# Patient Record
Sex: FEMALE | Race: WHITE | NOT HISPANIC OR LATINO | Employment: OTHER | URBAN - METROPOLITAN AREA
[De-identification: names, ages, dates, MRNs, and addresses within clinical notes are randomized per-mention and may not be internally consistent; named-entity substitution may affect disease eponyms.]

---

## 2020-12-02 ENCOUNTER — HISTORICAL (OUTPATIENT)
Dept: INTERNAL MEDICINE | Facility: CLINIC | Age: 55
End: 2020-12-02

## 2020-12-02 LAB
ABS NEUT (OLG): 3.84 X10(3)/MCL (ref 2.1–9.2)
ALBUMIN SERPL-MCNC: 4 GM/DL (ref 3.5–5)
ALBUMIN/GLOB SERPL: 1.1 RATIO (ref 1.1–2)
ALP SERPL-CCNC: 121 UNIT/L (ref 40–150)
ALT SERPL-CCNC: 36 UNIT/L (ref 0–55)
APPEARANCE, UA: CLEAR
AST SERPL-CCNC: 23 UNIT/L (ref 5–34)
BACTERIA #/AREA URNS AUTO: ABNORMAL /HPF
BASOPHILS # BLD AUTO: 0 X10(3)/MCL (ref 0–0.2)
BASOPHILS NFR BLD AUTO: 1 %
BILIRUB SERPL-MCNC: 0.6 MG/DL
BILIRUB UR QL STRIP: NEGATIVE
BILIRUBIN DIRECT+TOT PNL SERPL-MCNC: 0.2 MG/DL (ref 0–0.5)
BILIRUBIN DIRECT+TOT PNL SERPL-MCNC: 0.4 MG/DL (ref 0–0.8)
BUN SERPL-MCNC: 10 MG/DL (ref 9.8–20.1)
CALCIUM SERPL-MCNC: 9.1 MG/DL (ref 8.4–10.2)
CHLORIDE SERPL-SCNC: 102 MMOL/L (ref 98–107)
CHOLEST SERPL-MCNC: 204 MG/DL
CHOLEST/HDLC SERPL: 5 {RATIO} (ref 0–5)
CO2 SERPL-SCNC: 27 MMOL/L (ref 22–29)
COLOR UR: COLORLESS
CREAT SERPL-MCNC: 0.83 MG/DL (ref 0.55–1.02)
DEPRECATED CALCIDIOL+CALCIFEROL SERPL-MC: 38.1 NG/ML (ref 30–80)
EOSINOPHIL # BLD AUTO: 0.2 X10(3)/MCL (ref 0–0.9)
EOSINOPHIL NFR BLD AUTO: 2 %
ERYTHROCYTE [DISTWIDTH] IN BLOOD BY AUTOMATED COUNT: 12.6 % (ref 11.5–14.5)
EST. AVERAGE GLUCOSE BLD GHB EST-MCNC: 211.6 MG/DL
GLOBULIN SER-MCNC: 3.6 GM/DL (ref 2.4–3.5)
GLUCOSE (UA): NEGATIVE
GLUCOSE SERPL-MCNC: 186 MG/DL (ref 74–100)
HBA1C MFR BLD: 9 %
HCT VFR BLD AUTO: 41.1 % (ref 35–46)
HDLC SERPL-MCNC: 41 MG/DL (ref 35–60)
HGB BLD-MCNC: 13.1 GM/DL (ref 12–16)
HGB UR QL STRIP: NEGATIVE
HYALINE CASTS #/AREA URNS LPF: ABNORMAL /LPF
IMM GRANULOCYTES # BLD AUTO: 0.02 10*3/UL
IMM GRANULOCYTES NFR BLD AUTO: 0 %
KETONES UR QL STRIP: NEGATIVE
LDLC SERPL CALC-MCNC: 137 MG/DL (ref 50–140)
LEUKOCYTE ESTERASE UR QL STRIP: 500 LEU/UL
LYMPHOCYTES # BLD AUTO: 3.3 X10(3)/MCL (ref 0.6–4.6)
LYMPHOCYTES NFR BLD AUTO: 42 %
MCH RBC QN AUTO: 28.2 PG (ref 26–34)
MCHC RBC AUTO-ENTMCNC: 31.9 GM/DL (ref 31–37)
MCV RBC AUTO: 88.4 FL (ref 80–100)
MONOCYTES # BLD AUTO: 0.6 X10(3)/MCL (ref 0.1–1.3)
MONOCYTES NFR BLD AUTO: 7 %
NEUTROPHILS # BLD AUTO: 3.84 X10(3)/MCL (ref 2.1–9.2)
NEUTROPHILS NFR BLD AUTO: 48 %
NITRITE UR QL STRIP: NEGATIVE
PH UR STRIP: 5.5 [PH] (ref 4.5–8)
PLATELET # BLD AUTO: 253 X10(3)/MCL (ref 130–400)
PMV BLD AUTO: 11.8 FL (ref 7.4–10.4)
POTASSIUM SERPL-SCNC: 4.3 MMOL/L (ref 3.5–5.1)
PROT SERPL-MCNC: 7.6 GM/DL (ref 6.4–8.3)
PROT UR QL STRIP: NEGATIVE
RBC # BLD AUTO: 4.65 X10(6)/MCL (ref 4–5.2)
RBC #/AREA URNS AUTO: ABNORMAL /HPF
SODIUM SERPL-SCNC: 136 MMOL/L (ref 136–145)
SP GR UR STRIP: 1 (ref 1–1.03)
SQUAMOUS #/AREA URNS LPF: ABNORMAL /LPF
TRIGL SERPL-MCNC: 132 MG/DL (ref 37–140)
TSH SERPL-ACNC: 3.23 UIU/ML (ref 0.35–4.94)
UROBILINOGEN UR STRIP-ACNC: NORMAL
VIT B12 SERPL-MCNC: 1314 PG/ML (ref 213–816)
VLDLC SERPL CALC-MCNC: 26 MG/DL
WBC # SPEC AUTO: 7.9 X10(3)/MCL (ref 4.5–11)
WBC #/AREA URNS AUTO: ABNORMAL /HPF

## 2021-02-09 ENCOUNTER — HISTORICAL (OUTPATIENT)
Dept: INTERNAL MEDICINE | Facility: CLINIC | Age: 56
End: 2021-02-09

## 2021-02-09 LAB
ALBUMIN SERPL-MCNC: 4.2 GM/DL (ref 3.5–5)
ALBUMIN/GLOB SERPL: 1.2 RATIO (ref 1.1–2)
ALP SERPL-CCNC: 101 UNIT/L (ref 40–150)
ALT SERPL-CCNC: 17 UNIT/L (ref 0–55)
AST SERPL-CCNC: 14 UNIT/L (ref 5–34)
BILIRUB SERPL-MCNC: 0.4 MG/DL
BILIRUBIN DIRECT+TOT PNL SERPL-MCNC: 0.1 MG/DL (ref 0–0.5)
BILIRUBIN DIRECT+TOT PNL SERPL-MCNC: 0.3 MG/DL (ref 0–0.8)
BUN SERPL-MCNC: 14 MG/DL (ref 9.8–20.1)
CALCIUM SERPL-MCNC: 9.4 MG/DL (ref 8.4–10.2)
CHLORIDE SERPL-SCNC: 104 MMOL/L (ref 98–107)
CO2 SERPL-SCNC: 27 MMOL/L (ref 22–29)
CREAT SERPL-MCNC: 0.78 MG/DL (ref 0.55–1.02)
CREAT UR-MCNC: 102.5 MG/DL (ref 45–106)
EST. AVERAGE GLUCOSE BLD GHB EST-MCNC: 162.8 MG/DL
GLOBULIN SER-MCNC: 3.5 GM/DL (ref 2.4–3.5)
GLUCOSE SERPL-MCNC: 140 MG/DL (ref 74–100)
HBA1C MFR BLD: 7.3 %
MICROALBUMIN UR-MCNC: 9.4 UG/ML
MICROALBUMIN/CREAT RATIO PNL UR: 9.2 MG/GM CR (ref 0–30)
POTASSIUM SERPL-SCNC: 4.7 MMOL/L (ref 3.5–5.1)
PROT SERPL-MCNC: 7.7 GM/DL (ref 6.4–8.3)
SODIUM SERPL-SCNC: 140 MMOL/L (ref 136–145)
VIT B12 SERPL-MCNC: 690 PG/ML (ref 213–816)

## 2021-03-29 ENCOUNTER — HISTORICAL (OUTPATIENT)
Dept: INTERNAL MEDICINE | Facility: CLINIC | Age: 56
End: 2021-03-29

## 2021-03-29 LAB
ABS NEUT (OLG): 4.17 X10(3)/MCL (ref 2.1–9.2)
ALBUMIN SERPL-MCNC: 4.4 GM/DL (ref 3.5–5)
ALBUMIN/GLOB SERPL: 1.1 RATIO (ref 1.1–2)
ALP SERPL-CCNC: 113 UNIT/L (ref 40–150)
ALT SERPL-CCNC: 17 UNIT/L (ref 0–55)
AST SERPL-CCNC: 16 UNIT/L (ref 5–34)
BASOPHILS # BLD AUTO: 0 X10(3)/MCL (ref 0–0.2)
BASOPHILS NFR BLD AUTO: 0 %
BILIRUB SERPL-MCNC: 0.4 MG/DL
BILIRUBIN DIRECT+TOT PNL SERPL-MCNC: 0.2 MG/DL (ref 0–0.5)
BILIRUBIN DIRECT+TOT PNL SERPL-MCNC: 0.2 MG/DL (ref 0–0.8)
BUN SERPL-MCNC: 13.6 MG/DL (ref 9.8–20.1)
CALCIUM SERPL-MCNC: 9.5 MG/DL (ref 8.4–10.2)
CHLORIDE SERPL-SCNC: 104 MMOL/L (ref 98–107)
CHOLEST SERPL-MCNC: 195 MG/DL
CHOLEST/HDLC SERPL: 4 {RATIO} (ref 0–5)
CO2 SERPL-SCNC: 28 MMOL/L (ref 22–29)
CREAT SERPL-MCNC: 0.79 MG/DL (ref 0.55–1.02)
EOSINOPHIL # BLD AUTO: 0.1 X10(3)/MCL (ref 0–0.9)
EOSINOPHIL NFR BLD AUTO: 2 %
ERYTHROCYTE [DISTWIDTH] IN BLOOD BY AUTOMATED COUNT: 12.6 % (ref 11.5–14.5)
EST. AVERAGE GLUCOSE BLD GHB EST-MCNC: 142.7 MG/DL
GLOBULIN SER-MCNC: 3.9 GM/DL (ref 2.4–3.5)
GLUCOSE SERPL-MCNC: 132 MG/DL (ref 74–100)
HBA1C MFR BLD: 6.6 %
HCT VFR BLD AUTO: 42.8 % (ref 35–46)
HDLC SERPL-MCNC: 51 MG/DL (ref 35–60)
HGB BLD-MCNC: 13.5 GM/DL (ref 12–16)
IMM GRANULOCYTES # BLD AUTO: 0.02 10*3/UL
IMM GRANULOCYTES NFR BLD AUTO: 0 %
LDLC SERPL CALC-MCNC: 125 MG/DL (ref 50–140)
LYMPHOCYTES # BLD AUTO: 2.9 X10(3)/MCL (ref 0.6–4.6)
LYMPHOCYTES NFR BLD AUTO: 38 %
MCH RBC QN AUTO: 28 PG (ref 26–34)
MCHC RBC AUTO-ENTMCNC: 31.5 GM/DL (ref 31–37)
MCV RBC AUTO: 88.8 FL (ref 80–100)
MONOCYTES # BLD AUTO: 0.5 X10(3)/MCL (ref 0.1–1.3)
MONOCYTES NFR BLD AUTO: 6 %
NEUTROPHILS # BLD AUTO: 4.17 X10(3)/MCL (ref 2.1–9.2)
NEUTROPHILS NFR BLD AUTO: 54 %
PLATELET # BLD AUTO: 278 X10(3)/MCL (ref 130–400)
PMV BLD AUTO: 11.4 FL (ref 7.4–10.4)
POTASSIUM SERPL-SCNC: 4.1 MMOL/L (ref 3.5–5.1)
PROT SERPL-MCNC: 8.3 GM/DL (ref 6.4–8.3)
RBC # BLD AUTO: 4.82 X10(6)/MCL (ref 4–5.2)
SODIUM SERPL-SCNC: 140 MMOL/L (ref 136–145)
TRIGL SERPL-MCNC: 94 MG/DL (ref 37–140)
VIT B12 SERPL-MCNC: 736 PG/ML (ref 213–816)
VLDLC SERPL CALC-MCNC: 19 MG/DL
WBC # SPEC AUTO: 7.7 X10(3)/MCL (ref 4.5–11)

## 2021-04-05 ENCOUNTER — HISTORICAL (OUTPATIENT)
Dept: ADMINISTRATIVE | Facility: HOSPITAL | Age: 56
End: 2021-04-05

## 2021-04-05 LAB
APPEARANCE, UA: CLEAR
BACTERIA #/AREA URNS AUTO: ABNORMAL /HPF
BILIRUB UR QL STRIP: NEGATIVE
COLOR UR: COLORLESS
GLUCOSE (UA): NEGATIVE
HGB UR QL STRIP: NEGATIVE
HYALINE CASTS #/AREA URNS LPF: ABNORMAL /LPF
KETONES UR QL STRIP: NEGATIVE
LEUKOCYTE ESTERASE UR QL STRIP: 25 LEU/UL
NITRITE UR QL STRIP: NEGATIVE
PH UR STRIP: 5 [PH] (ref 4.5–8)
PROT UR QL STRIP: NEGATIVE
RBC #/AREA URNS AUTO: ABNORMAL /HPF
SP GR UR STRIP: 1 (ref 1–1.03)
SQUAMOUS #/AREA URNS LPF: ABNORMAL /LPF
UROBILINOGEN UR STRIP-ACNC: NORMAL
WBC #/AREA URNS AUTO: ABNORMAL /HPF

## 2021-08-25 ENCOUNTER — HISTORICAL (OUTPATIENT)
Dept: INTERNAL MEDICINE | Facility: CLINIC | Age: 56
End: 2021-08-25

## 2021-08-25 LAB
ALBUMIN SERPL-MCNC: 4 GM/DL (ref 3.5–5)
ALBUMIN/GLOB SERPL: 1.1 RATIO (ref 1.1–2)
ALP SERPL-CCNC: 102 UNIT/L (ref 40–150)
ALT SERPL-CCNC: 17 UNIT/L (ref 0–55)
AST SERPL-CCNC: 15 UNIT/L (ref 5–34)
BILIRUB SERPL-MCNC: 0.4 MG/DL
BILIRUBIN DIRECT+TOT PNL SERPL-MCNC: 0.1 MG/DL (ref 0–0.5)
BILIRUBIN DIRECT+TOT PNL SERPL-MCNC: 0.3 MG/DL (ref 0–0.8)
BUN SERPL-MCNC: 9.3 MG/DL (ref 9.8–20.1)
CALCIUM SERPL-MCNC: 9.7 MG/DL (ref 8.4–10.2)
CHLORIDE SERPL-SCNC: 106 MMOL/L (ref 98–107)
CO2 SERPL-SCNC: 26 MMOL/L (ref 22–29)
CREAT SERPL-MCNC: 0.85 MG/DL (ref 0.55–1.02)
EST. AVERAGE GLUCOSE BLD GHB EST-MCNC: 131.2 MG/DL
GLOBULIN SER-MCNC: 3.7 GM/DL (ref 2.4–3.5)
GLUCOSE SERPL-MCNC: 131 MG/DL (ref 74–100)
HBA1C MFR BLD: 6.2 %
POTASSIUM SERPL-SCNC: 4.9 MMOL/L (ref 3.5–5.1)
PROT SERPL-MCNC: 7.7 GM/DL (ref 6.4–8.3)
SODIUM SERPL-SCNC: 142 MMOL/L (ref 136–145)

## 2022-04-10 ENCOUNTER — HISTORICAL (OUTPATIENT)
Dept: ADMINISTRATIVE | Facility: HOSPITAL | Age: 57
End: 2022-04-10

## 2022-04-30 VITALS
BODY MASS INDEX: 26.3 KG/M2 | WEIGHT: 157.88 LBS | HEIGHT: 65 IN | DIASTOLIC BLOOD PRESSURE: 74 MMHG | SYSTOLIC BLOOD PRESSURE: 110 MMHG | OXYGEN SATURATION: 98 %

## 2022-06-03 DIAGNOSIS — Z11.59 SCREENING FOR VIRAL DISEASE: ICD-10-CM

## 2022-06-03 DIAGNOSIS — E11.69 TYPE 2 DIABETES MELLITUS WITH OTHER SPECIFIED COMPLICATION, WITHOUT LONG-TERM CURRENT USE OF INSULIN: Primary | ICD-10-CM

## 2022-06-03 DIAGNOSIS — Z13.0 SCREENING FOR IRON DEFICIENCY ANEMIA: ICD-10-CM

## 2022-06-03 DIAGNOSIS — Z13.220 SCREENING FOR LIPID DISORDERS: ICD-10-CM

## 2022-06-03 DIAGNOSIS — Z11.3 ROUTINE SCREENING FOR STI (SEXUALLY TRANSMITTED INFECTION): ICD-10-CM

## 2022-06-03 DIAGNOSIS — Z13.29 SCREENING FOR THYROID DISORDER: ICD-10-CM

## 2022-06-03 DIAGNOSIS — Z11.4 SCREENING FOR HIV (HUMAN IMMUNODEFICIENCY VIRUS): ICD-10-CM

## 2022-06-03 DIAGNOSIS — Z13.21 ENCOUNTER FOR VITAMIN DEFICIENCY SCREENING: ICD-10-CM

## 2022-06-23 ENCOUNTER — LAB VISIT (OUTPATIENT)
Dept: LAB | Facility: HOSPITAL | Age: 57
End: 2022-06-23
Payer: MEDICAID

## 2022-06-23 DIAGNOSIS — E11.69 TYPE 2 DIABETES MELLITUS WITH OTHER SPECIFIED COMPLICATION, WITHOUT LONG-TERM CURRENT USE OF INSULIN: ICD-10-CM

## 2022-06-23 DIAGNOSIS — Z13.0 SCREENING FOR IRON DEFICIENCY ANEMIA: ICD-10-CM

## 2022-06-23 DIAGNOSIS — Z13.21 ENCOUNTER FOR VITAMIN DEFICIENCY SCREENING: ICD-10-CM

## 2022-06-23 DIAGNOSIS — Z11.4 SCREENING FOR HIV (HUMAN IMMUNODEFICIENCY VIRUS): ICD-10-CM

## 2022-06-23 DIAGNOSIS — Z13.29 SCREENING FOR THYROID DISORDER: ICD-10-CM

## 2022-06-23 DIAGNOSIS — Z11.59 SCREENING FOR VIRAL DISEASE: ICD-10-CM

## 2022-06-23 DIAGNOSIS — Z13.220 SCREENING FOR LIPID DISORDERS: ICD-10-CM

## 2022-06-23 DIAGNOSIS — Z11.3 ROUTINE SCREENING FOR STI (SEXUALLY TRANSMITTED INFECTION): ICD-10-CM

## 2022-06-23 LAB
ALBUMIN SERPL-MCNC: 4.1 GM/DL (ref 3.5–5)
ALBUMIN/GLOB SERPL: 1.2 RATIO (ref 1.1–2)
ALP SERPL-CCNC: 98 UNIT/L (ref 40–150)
ALT SERPL-CCNC: 19 UNIT/L (ref 0–55)
APPEARANCE UR: CLEAR
AST SERPL-CCNC: 16 UNIT/L (ref 5–34)
BACTERIA #/AREA URNS AUTO: ABNORMAL /HPF
BASOPHILS # BLD AUTO: 0.05 X10(3)/MCL (ref 0–0.2)
BASOPHILS NFR BLD AUTO: 0.7 %
BILIRUB UR QL STRIP.AUTO: NEGATIVE MG/DL
BILIRUBIN DIRECT+TOT PNL SERPL-MCNC: 0.5 MG/DL
BUN SERPL-MCNC: 9.8 MG/DL (ref 9.8–20.1)
C TRACH DNA SPEC QL NAA+PROBE: NOT DETECTED
CALCIUM SERPL-MCNC: 10 MG/DL (ref 8.4–10.2)
CHLORIDE SERPL-SCNC: 108 MMOL/L (ref 98–107)
CHOLEST SERPL-MCNC: 189 MG/DL
CHOLEST/HDLC SERPL: 4 {RATIO} (ref 0–5)
CO2 SERPL-SCNC: 27 MMOL/L (ref 22–29)
COLOR UR AUTO: YELLOW
CREAT SERPL-MCNC: 0.81 MG/DL (ref 0.55–1.02)
CREAT UR-MCNC: 172.6 MG/DL (ref 47–110)
DEPRECATED CALCIDIOL+CALCIFEROL SERPL-MC: 49.9 NG/ML (ref 30–80)
EOSINOPHIL # BLD AUTO: 0.21 X10(3)/MCL (ref 0–0.9)
EOSINOPHIL NFR BLD AUTO: 3 %
ERYTHROCYTE [DISTWIDTH] IN BLOOD BY AUTOMATED COUNT: 12.4 % (ref 11.5–17)
EST. AVERAGE GLUCOSE BLD GHB EST-MCNC: 134.1 MG/DL
FERRITIN SERPL-MCNC: 89.99 NG/ML (ref 4.63–204)
GLOBULIN SER-MCNC: 3.5 GM/DL (ref 2.4–3.5)
GLUCOSE SERPL-MCNC: 143 MG/DL (ref 74–100)
GLUCOSE UR QL STRIP.AUTO: NORMAL MG/DL
HAV IGM SERPL QL IA: NONREACTIVE
HBA1C MFR BLD: 6.3 %
HBV CORE IGM SERPL QL IA: NONREACTIVE
HBV SURFACE AG SERPL QL IA: NONREACTIVE
HCT VFR BLD AUTO: 42.4 % (ref 37–47)
HCV AB SERPL QL IA: NONREACTIVE
HDLC SERPL-MCNC: 52 MG/DL (ref 35–60)
HGB BLD-MCNC: 13.4 GM/DL (ref 12–16)
HIV 1+2 AB+HIV1 P24 AG SERPL QL IA: NONREACTIVE
HYALINE CASTS #/AREA URNS LPF: ABNORMAL /LPF
IMM GRANULOCYTES # BLD AUTO: 0.01 X10(3)/MCL (ref 0–0.02)
IMM GRANULOCYTES NFR BLD AUTO: 0.1 % (ref 0–0.43)
IRON SATN MFR SERPL: 26 % (ref 20–50)
IRON SERPL-MCNC: 80 UG/DL (ref 50–170)
KETONES UR QL STRIP.AUTO: NEGATIVE MG/DL
LDLC SERPL CALC-MCNC: 113 MG/DL (ref 50–140)
LEUKOCYTE ESTERASE UR QL STRIP.AUTO: NEGATIVE UNIT/L
LYMPHOCYTES # BLD AUTO: 3.14 X10(3)/MCL (ref 0.6–4.6)
LYMPHOCYTES NFR BLD AUTO: 44.6 %
MCH RBC QN AUTO: 28.2 PG (ref 27–31)
MCHC RBC AUTO-ENTMCNC: 31.6 MG/DL (ref 33–36)
MCV RBC AUTO: 89.1 FL (ref 80–94)
MICROALBUMIN UR-MCNC: 14.9 UG/ML
MICROALBUMIN/CREAT RATIO PNL UR: 8.6 MG/GM CR (ref 0–30)
MONOCYTES # BLD AUTO: 0.45 X10(3)/MCL (ref 0.1–1.3)
MONOCYTES NFR BLD AUTO: 6.4 %
MUCOUS THREADS URNS QL MICRO: ABNORMAL /LPF
N GONORRHOEA DNA SPEC QL NAA+PROBE: NOT DETECTED
NEUTROPHILS # BLD AUTO: 3.2 X10(3)/MCL (ref 2.1–9.2)
NEUTROPHILS NFR BLD AUTO: 45.2 %
NITRITE UR QL STRIP.AUTO: NEGATIVE
NRBC BLD AUTO-RTO: 0 %
PH UR STRIP.AUTO: 5 [PH]
PLATELET # BLD AUTO: 253 X10(3)/MCL (ref 130–400)
PMV BLD AUTO: 11.6 FL (ref 9.4–12.4)
POTASSIUM SERPL-SCNC: 4.4 MMOL/L (ref 3.5–5.1)
PROT SERPL-MCNC: 7.6 GM/DL (ref 6.4–8.3)
PROT UR QL STRIP.AUTO: NEGATIVE MG/DL
RBC # BLD AUTO: 4.76 X10(6)/MCL (ref 4.2–5.4)
RBC #/AREA URNS AUTO: ABNORMAL /HPF
RBC UR QL AUTO: ABNORMAL UNIT/L
SODIUM SERPL-SCNC: 143 MMOL/L (ref 136–145)
SP GR UR STRIP.AUTO: 1.02
SQUAMOUS #/AREA URNS LPF: ABNORMAL /HPF
T4 FREE SERPL-MCNC: 0.91 NG/DL (ref 0.7–1.48)
TIBC SERPL-MCNC: 232 UG/DL (ref 70–310)
TIBC SERPL-MCNC: 312 UG/DL (ref 250–450)
TRANSFERRIN SERPL-MCNC: 276 MG/DL (ref 180–382)
TRIGL SERPL-MCNC: 120 MG/DL (ref 37–140)
TSH SERPL-ACNC: 3.72 UIU/ML (ref 0.35–4.94)
UROBILINOGEN UR STRIP-ACNC: NORMAL MG/DL
VLDLC SERPL CALC-MCNC: 24 MG/DL
WBC # SPEC AUTO: 7 X10(3)/MCL (ref 4.5–11.5)
WBC #/AREA URNS AUTO: ABNORMAL /HPF

## 2022-06-23 PROCEDURE — 83540 ASSAY OF IRON: CPT

## 2022-06-23 PROCEDURE — 81001 URINALYSIS AUTO W/SCOPE: CPT

## 2022-06-23 PROCEDURE — 80061 LIPID PANEL: CPT

## 2022-06-23 PROCEDURE — 85025 COMPLETE CBC W/AUTO DIFF WBC: CPT

## 2022-06-23 PROCEDURE — 82043 UR ALBUMIN QUANTITATIVE: CPT

## 2022-06-23 PROCEDURE — 84443 ASSAY THYROID STIM HORMONE: CPT

## 2022-06-23 PROCEDURE — 84439 ASSAY OF FREE THYROXINE: CPT

## 2022-06-23 PROCEDURE — 82728 ASSAY OF FERRITIN: CPT

## 2022-06-23 PROCEDURE — 83036 HEMOGLOBIN GLYCOSYLATED A1C: CPT

## 2022-06-23 PROCEDURE — 80074 ACUTE HEPATITIS PANEL: CPT

## 2022-06-23 PROCEDURE — 36415 COLL VENOUS BLD VENIPUNCTURE: CPT

## 2022-06-23 PROCEDURE — 82306 VITAMIN D 25 HYDROXY: CPT

## 2022-06-23 PROCEDURE — 87491 CHLMYD TRACH DNA AMP PROBE: CPT

## 2022-06-23 PROCEDURE — 87591 N.GONORRHOEAE DNA AMP PROB: CPT

## 2022-06-23 PROCEDURE — 80053 COMPREHEN METABOLIC PANEL: CPT

## 2022-06-23 PROCEDURE — 87389 HIV-1 AG W/HIV-1&-2 AB AG IA: CPT

## 2022-06-24 LAB — PATH REV: NORMAL

## 2022-06-24 RX ORDER — MULTIVITAMIN
1 TABLET ORAL DAILY
COMMUNITY
Start: 2021-09-02 | End: 2023-11-02

## 2022-06-24 RX ORDER — METFORMIN HYDROCHLORIDE 500 MG/1
500 TABLET, EXTENDED RELEASE ORAL 2 TIMES DAILY
COMMUNITY
Start: 2021-09-02 | End: 2022-06-27

## 2022-06-26 NOTE — PROGRESS NOTES
PATIENT NAME: Alondra Plasencia  : 1965  DATE: 22  MRN: [id number]          Reason for Visit/Chief Complaint   Establish Care and lab review       History of Present Illness (HPI)     Alondra Plasencia is a 57 y.o. female presenting in clinic today Establish Care and lab review.  Previous PCP-Rubi Singh NP. PMH of DM and GERD (controlled with diet).     Denies any complaints today.     [id number]-9.3. She decreased metformin from 500 mg BID to daily. Reports home capillary blood sugar is no higher than 130. No log presented in clinic.  Pt is currently on a plant based diet, with no dairy, and very limited meat. Has added daily exercises. Intentional weight loss of perri 23lbs noted since November. She states she walks at least 3 times per week.     Denies smoking, drinking, or illicit drug use. Declines shingles and tetanus vaccines. Denies chest pain, shortness of breath, cough, headache, dizziness, weakness, abdominal pain, nausea, vomiting, diarrhea, constipation, dysuria, SI, and HI.    Breast Cancer Screening - Of note, was ordered at last office visit, but did not complete. Will reorder MMG.  Cervical Cancer Screening - Of note, was referred at last office visit. Re-referral today.  Osteoporosis Screening - Deferred due to age. Mother had osteoporosis, started on Ca 600 mg BID and vitamin D3 2000 IU daily due to plant based diet and no dairy intake.  Colon Cancer Screening - Colonoscopy in 2016 unsure of provider/location. FIT negative 2021.  Vaccines: Patient refused all vaccines      Review of Systems     Review of Systems   Constitutional: Negative.    HENT: Negative.    Eyes: Negative.    Respiratory: Negative.    Cardiovascular: Negative.    Gastrointestinal: Negative.    Endocrine: Negative.    Genitourinary: Negative.    Musculoskeletal: Negative.    Allergic/Immunologic: Negative.    Neurological: Negative.    Hematological: Negative.    Psychiatric/Behavioral: Negative.        Medical /  Social / Family History     Past Medical History:   Diagnosis Date    Crohn's disease     Diabetes mellitus, type 2     DM (diabetes mellitus)     GERD (gastroesophageal reflux disease)     Obesity, unspecified          Past Surgical History:   Procedure Laterality Date    CHOLECYSTECTOMY      FOOT SURGERY           Social History  Alondra Plasencia's  reports that she has never smoked. She has never used smokeless tobacco. She reports previous alcohol use. She reports that she does not use drugs.    Family History  Alondra Plasencia's family history includes Depression in her brother; Diabetes type II in her father; Heart disease in her mother; Multiple sclerosis in her father.    Medications and Allergies     Medications  Medication List with Changes/Refills   Current Medications    CALCIUM-VITAMIN D 600 MG-10 MCG (400 UNIT) TAB    Take 1 tablet by mouth 2 (two) times a day.   Changed and/or Refilled Medications    Modified Medication Previous Medication    METFORMIN (GLUCOPHAGE-XR) 500 MG ER 24HR TABLET metFORMIN (GLUCOPHAGE-XR) 500 MG ER 24hr tablet       Take 1 tablet (500 mg total) by mouth once daily.    Take 500 mg by mouth 2 (two) times a day.         Allergies  Review of patient's allergies indicates:  No Known Allergies    Physical Examination     Vitals:    06/27/22 1000   BP: 122/77   Pulse: 68   Resp: 20   Temp: 98.2 °F (36.8 °C)     Physical Exam  Constitutional:       Appearance: Normal appearance. She is normal weight.   HENT:      Head: Normocephalic and atraumatic.      Right Ear: External ear normal.      Left Ear: External ear normal.      Nose: Nose normal.      Mouth/Throat:      Mouth: Mucous membranes are moist.      Pharynx: Oropharynx is clear.   Eyes:      Extraocular Movements: Extraocular movements intact.      Conjunctiva/sclera: Conjunctivae normal.      Pupils: Pupils are equal, round, and reactive to light.   Cardiovascular:      Rate and Rhythm: Normal rate and regular rhythm.       Pulses: Normal pulses.           Dorsalis pedis pulses are 2+ on the right side and 2+ on the left side.        Posterior tibial pulses are 2+ on the right side and 2+ on the left side.      Heart sounds: Normal heart sounds.   Pulmonary:      Effort: Pulmonary effort is normal.      Breath sounds: Normal breath sounds.   Abdominal:      General: Bowel sounds are normal.      Palpations: Abdomen is soft.   Musculoskeletal:         General: Normal range of motion.      Cervical back: Normal range of motion and neck supple.   Feet:      Right foot:      Protective Sensation: 6 sites tested. 6 sites sensed.      Skin integrity: Skin integrity normal.      Toenail Condition: Right toenails are normal.      Left foot:      Protective Sensation: 6 sites tested. 6 sites sensed.      Skin integrity: Skin integrity normal.      Toenail Condition: Left toenails are normal.      Comments: Toes polished, unable to see nail beds.   Skin:     General: Skin is warm and dry.      Capillary Refill: Capillary refill takes less than 2 seconds.   Neurological:      General: No focal deficit present.      Mental Status: She is alert and oriented to person, place, and time.   Psychiatric:         Mood and Affect: Mood normal.         Behavior: Behavior normal.         Thought Content: Thought content normal.         Judgment: Judgment normal.           Results     Lab Results   Component Value Date    WBC 7.0 06/23/2022    RBC 4.76 06/23/2022    HGB 13.4 06/23/2022    HCT 42.4 06/23/2022    MCV 89.1 06/23/2022    MCH 28.2 06/23/2022    MCHC 31.6 (L) 06/23/2022    RDW 12.4 06/23/2022     06/23/2022    MPV 11.6 06/23/2022     Lab Results   Component Value Date     06/23/2022    K 4.4 06/23/2022    CO2 27 06/23/2022    BUN 9.8 06/23/2022    CREATININE 0.81 06/23/2022    CALCIUM 10.0 06/23/2022    ALBUMIN 4.1 06/23/2022    BILITOT 0.5 06/23/2022    ALKPHOS 98 06/23/2022    AST 16 06/23/2022    ALT 19 06/23/2022    EGFRIFAFRICA  88 (L) 08/25/2021    EGFRNONAA >60 06/23/2022     Lab Results   Component Value Date    TSH 3.7153 06/23/2022     Lab Results   Component Value Date    CHOL 189 06/23/2022    HDL 52 06/23/2022    .00 06/23/2022    TRIG 120 06/23/2022     Lab Results   Component Value Date    COLORU COLORLESS 04/05/2021    APPEARANCEUA Clear 06/23/2022    PHUR 5.0 04/05/2021    GLUCUA Negative 04/05/2021    KETONESU Negative 04/05/2021    OCCULTUA Negative 04/05/2021    NITRITE Negative 04/05/2021    LEUKOCYTESUR Negative 06/23/2022    RBCUA 0-5 06/23/2022    WBCUA 0-5 06/23/2022    BACTERIA None Seen 06/23/2022    HYALINECASTS 0-2 (A) 06/23/2022     Lab Results   Component Value Date    CREATRANDUR 172.6 (H) 06/23/2022     Lab Results   Component Value Date    [id number] 49.9 06/23/2022     Lab Results   Component Value Date    HIV Nonreactive 06/23/2022    HEPAIGM Nonreactive 06/23/2022    HEPBCOREM Nonreactive 06/23/2022    HEPCAB Nonreactive 06/23/2022     No results found for: FITDIAG, COLOGUARD  No results found for: OCCBLDIA        Assessment and Plan (including Health Maintenance)     Health Maintenance Due   Topic Date Due    Cervical Cancer Screening  Never done    COVID-19 Vaccine (1) Never done    Mammogram  Never done    Colorectal Cancer Screening  Never done       Problem List Items Addressed This Visit        Ophtho    Screening for diabetic retinopathy    Current Assessment & Plan     Fundus ordered today.           Relevant Orders    Diabetic Eye Screening Photo       Renal/    Cervical cancer screening    Current Assessment & Plan     Referral to GYN.           Relevant Orders    Ambulatory referral/consult to Gynecology    Breast cancer screening by mammogram    Current Assessment & Plan     MMG ordered.           Relevant Orders    Mammo Digital Screening Bilat w/ Aldo       Endocrine    Type 2 diabetes mellitus without complication, without long-term current use of insulin    Current Assessment &  Plan     Lab Results   Component Value Date    HGBA1C 6.3 06/23/2022     at goal.  Patient decreased Metformin to daily. Ok to continue daily dose.  Continue medications as prescribed.   Follow ADA diet.   Avoid soda, simple sweets, and limit rice/pasta/bread/starches and consume brown options when possible.    Maintain healthy weight with BMI goal <30.    Perform aerobic exercise for 150 minutes per week (or 5 days a week for 30 minutes each day).    Examine feet daily.    Obtain annual dilated eye exam.   Eye exam: 6/27/2022  Foot exam: 6/27/2022             Relevant Medications    metFORMIN (GLUCOPHAGE-XR) 500 MG ER 24hr tablet    Other Relevant Orders    Comprehensive Metabolic Panel    Hemoglobin A1C    Microalbumin/Creatinine Ratio, Urine    Urinalysis, Reflex to Urine Culture Urine, Clean Catch    BMI 27.0-27.9,adult    Current Assessment & Plan     Body mass index is 27.14 kg/m². (At goal).                GI    Screening for malignant neoplasm of colon - Primary    Current Assessment & Plan     Cologuard ordered, will refer to GI if indicated.           Relevant Orders    Cologuard Screening (Multitarget Stool DNA)          Health Maintenance Topics with due status: Not Due       Topic Last Completion Date    Lipid Panel 06/23/2022    Influenza Vaccine Not Due       Future Appointments   Date Time Provider Department Center   11/1/2022 12:00 PM LIBIA Gregorio [facility]      Virtual visit in 4 months.  RTC prn.  Labs one week prior to visit.      Signature:        LIBIA Gregorio  [facility]  [address] [id number]    Date of encounter: 6/27/22

## 2022-06-27 ENCOUNTER — CLINICAL SUPPORT (OUTPATIENT)
Dept: INTERNAL MEDICINE | Facility: CLINIC | Age: 57
End: 2022-06-27
Payer: MEDICAID

## 2022-06-27 ENCOUNTER — OFFICE VISIT (OUTPATIENT)
Dept: INTERNAL MEDICINE | Facility: CLINIC | Age: 57
End: 2022-06-27
Payer: MEDICAID

## 2022-06-27 VITALS
HEART RATE: 68 BPM | HEIGHT: 65 IN | TEMPERATURE: 98 F | BODY MASS INDEX: 26.81 KG/M2 | WEIGHT: 160.94 LBS | DIASTOLIC BLOOD PRESSURE: 77 MMHG | OXYGEN SATURATION: 98 % | RESPIRATION RATE: 20 BRPM | SYSTOLIC BLOOD PRESSURE: 122 MMHG

## 2022-06-27 DIAGNOSIS — E11.9 TYPE 2 DIABETES MELLITUS WITHOUT COMPLICATION, WITHOUT LONG-TERM CURRENT USE OF INSULIN: ICD-10-CM

## 2022-06-27 DIAGNOSIS — Z13.5 SCREENING FOR DIABETIC RETINOPATHY: ICD-10-CM

## 2022-06-27 DIAGNOSIS — Z12.31 BREAST CANCER SCREENING BY MAMMOGRAM: ICD-10-CM

## 2022-06-27 DIAGNOSIS — Z12.11 SCREENING FOR MALIGNANT NEOPLASM OF COLON: Primary | ICD-10-CM

## 2022-06-27 DIAGNOSIS — Z12.4 CERVICAL CANCER SCREENING: ICD-10-CM

## 2022-06-27 PROCEDURE — 92228 IMG RTA DETC/MNTR DS PHY/QHP: CPT | Mod: PBBFAC

## 2022-06-27 PROCEDURE — 1160F RVW MEDS BY RX/DR IN RCRD: CPT | Mod: CPTII,,,

## 2022-06-27 PROCEDURE — 99214 PR OFFICE/OUTPT VISIT, EST, LEVL IV, 30-39 MIN: ICD-10-PCS | Mod: S$PBB,,,

## 2022-06-27 PROCEDURE — 3074F PR MOST RECENT SYSTOLIC BLOOD PRESSURE < 130 MM HG: ICD-10-PCS | Mod: CPTII,,,

## 2022-06-27 PROCEDURE — 99214 OFFICE O/P EST MOD 30 MIN: CPT | Mod: S$PBB,,,

## 2022-06-27 PROCEDURE — 2025F 7 FLD RTA PHOTO W/O RTNOPTHY: CPT | Mod: PBBFAC,CPTII | Performed by: INTERNAL MEDICINE

## 2022-06-27 PROCEDURE — 3008F PR BODY MASS INDEX (BMI) DOCUMENTED: ICD-10-PCS | Mod: CPTII,,,

## 2022-06-27 PROCEDURE — 1160F PR REVIEW ALL MEDS BY PRESCRIBER/CLIN PHARMACIST DOCUMENTED: ICD-10-PCS | Mod: CPTII,,,

## 2022-06-27 PROCEDURE — 1159F PR MEDICATION LIST DOCUMENTED IN MEDICAL RECORD: ICD-10-PCS | Mod: CPTII,,,

## 2022-06-27 PROCEDURE — 1159F MED LIST DOCD IN RCRD: CPT | Mod: CPTII,,,

## 2022-06-27 PROCEDURE — 3078F DIAST BP <80 MM HG: CPT | Mod: CPTII,,,

## 2022-06-27 PROCEDURE — 3078F PR MOST RECENT DIASTOLIC BLOOD PRESSURE < 80 MM HG: ICD-10-PCS | Mod: CPTII,,,

## 2022-06-27 PROCEDURE — 3008F BODY MASS INDEX DOCD: CPT | Mod: CPTII,,,

## 2022-06-27 PROCEDURE — 99215 OFFICE O/P EST HI 40 MIN: CPT | Mod: PBBFAC

## 2022-06-27 PROCEDURE — 3074F SYST BP LT 130 MM HG: CPT | Mod: CPTII,,,

## 2022-06-27 RX ORDER — METFORMIN HYDROCHLORIDE 500 MG/1
500 TABLET, EXTENDED RELEASE ORAL DAILY
Qty: 90 TABLET | Refills: 1 | Status: SHIPPED | OUTPATIENT
Start: 2022-06-27 | End: 2022-11-01 | Stop reason: SDUPTHER

## 2022-06-27 NOTE — ASSESSMENT & PLAN NOTE
Lab Results   Component Value Date    HGBA1C 6.3 06/23/2022     at goal.  Patient decreased Metformin to daily. Ok to continue daily dose.  Continue medications as prescribed.   Follow ADA diet.   Avoid soda, simple sweets, and limit rice/pasta/bread/starches and consume brown options when possible.    Maintain healthy weight with BMI goal <30.    Perform aerobic exercise for 150 minutes per week (or 5 days a week for 30 minutes each day).    Examine feet daily.    Obtain annual dilated eye exam.   Eye exam: 6/27/2022  Foot exam: 6/27/2022

## 2022-07-01 ENCOUNTER — TELEPHONE (OUTPATIENT)
Dept: INTERNAL MEDICINE | Facility: CLINIC | Age: 57
End: 2022-07-01
Payer: MEDICAID

## 2022-07-01 DIAGNOSIS — E11.9 TYPE 2 DIABETES MELLITUS WITHOUT COMPLICATION, WITHOUT LONG-TERM CURRENT USE OF INSULIN: Primary | ICD-10-CM

## 2022-07-19 LAB — NONINV COLON CA DNA+OCC BLD SCRN STL QL: NEGATIVE

## 2022-07-19 NOTE — PROGRESS NOTES
Alondra Plasencia is a 57 y.o. female here for a diabetic eye screening with non-dilated fundus photos per ISIDRO Berg NP.    Patient cooperative?: Yes  Small pupils?: No  Last eye exam: unknown    For exam results, see Encounter Report.

## 2022-07-20 ENCOUNTER — TELEPHONE (OUTPATIENT)
Dept: INTERNAL MEDICINE | Facility: CLINIC | Age: 57
End: 2022-07-20
Payer: MEDICAID

## 2022-07-20 NOTE — TELEPHONE ENCOUNTER
I spoke with patient and informed her that her Cologuard result is negative and will be repeated in 3 yrs. Patient verbalized understanding.

## 2022-07-20 NOTE — TELEPHONE ENCOUNTER
Please inform Alondra Plasencia that the COLOGUARD stool test is Negative. Testing will be repeated in 3 years.    Thank you,    VIOLETA Colunga

## 2022-09-12 ENCOUNTER — OFFICE VISIT (OUTPATIENT)
Dept: URGENT CARE | Facility: CLINIC | Age: 57
End: 2022-09-12
Payer: MEDICAID

## 2022-09-12 VITALS
HEART RATE: 69 BPM | HEIGHT: 65 IN | DIASTOLIC BLOOD PRESSURE: 67 MMHG | SYSTOLIC BLOOD PRESSURE: 100 MMHG | TEMPERATURE: 99 F | OXYGEN SATURATION: 99 % | WEIGHT: 158.5 LBS | BODY MASS INDEX: 26.41 KG/M2 | RESPIRATION RATE: 20 BRPM

## 2022-09-12 DIAGNOSIS — N39.0 ACUTE UTI: Primary | ICD-10-CM

## 2022-09-12 DIAGNOSIS — R39.9 UTI SYMPTOMS: ICD-10-CM

## 2022-09-12 LAB
BILIRUB UR QL STRIP: NEGATIVE
GLUCOSE UR QL STRIP: NEGATIVE
KETONES UR QL STRIP: NEGATIVE
LEUKOCYTE ESTERASE UR QL STRIP: NEGATIVE
PH, POC UA: 5.5
POC BLOOD, URINE: POSITIVE
POC NITRATES, URINE: NEGATIVE
PROT UR QL STRIP: NEGATIVE
SP GR UR STRIP: 1.02 (ref 1–1.03)
UROBILINOGEN UR STRIP-ACNC: 0.2 (ref 0.1–1.1)

## 2022-09-12 PROCEDURE — 99213 OFFICE O/P EST LOW 20 MIN: CPT | Mod: S$PBB,,, | Performed by: NURSE PRACTITIONER

## 2022-09-12 PROCEDURE — 87088 URINE BACTERIA CULTURE: CPT | Performed by: NURSE PRACTITIONER

## 2022-09-12 PROCEDURE — 99213 OFFICE O/P EST LOW 20 MIN: CPT | Mod: PBBFAC | Performed by: NURSE PRACTITIONER

## 2022-09-12 PROCEDURE — 99213 PR OFFICE/OUTPT VISIT, EST, LEVL III, 20-29 MIN: ICD-10-PCS | Mod: S$PBB,,, | Performed by: NURSE PRACTITIONER

## 2022-09-12 PROCEDURE — 81003 URINALYSIS AUTO W/O SCOPE: CPT | Mod: PBBFAC | Performed by: NURSE PRACTITIONER

## 2022-09-12 RX ORDER — NITROFURANTOIN 25; 75 MG/1; MG/1
100 CAPSULE ORAL 2 TIMES DAILY
Qty: 14 CAPSULE | Refills: 0 | Status: SHIPPED | OUTPATIENT
Start: 2022-09-12 | End: 2022-09-19

## 2022-09-12 NOTE — PROGRESS NOTES
"Subjective:       Patient ID: Alondra Plasencia is a 57 y.o. female.    Vitals:  height is 5' 5" (1.651 m) and weight is 71.9 kg (158 lb 8 oz). Her temperature is 98.7 °F (37.1 °C). Her blood pressure is 100/67 and her pulse is 69. Her respiration is 20 and oxygen saturation is 99%.     Chief Complaint: Urinary Tract Infection (Patient sts symptoms started x 2 weeks ago)    Patient is a 57-year-old female, here today for burning on urination off and on over the past 2 weeks.  Not   Taking any medication for symptoms.      Constitution: Negative.   Cardiovascular: Negative.    Respiratory: Negative.     Gastrointestinal: Negative.    Genitourinary:  Positive for dysuria.     Objective:      Physical Exam   Constitutional: She is oriented to person, place, and time. She appears well-developed.   HENT:   Head: Normocephalic.   Eyes: Conjunctivae and EOM are normal. Pupils are equal, round, and reactive to light.   Neck: Neck supple.   Cardiovascular: Normal rate, regular rhythm and normal heart sounds.   Pulmonary/Chest: Effort normal and breath sounds normal.   Abdominal: Bowel sounds are normal. Soft. There is no left CVA tenderness and no right CVA tenderness.   Musculoskeletal: Normal range of motion.         General: Normal range of motion.   Neurological: She is alert and oriented to person, place, and time.   Skin: Skin is warm and dry.   Psychiatric: Her behavior is normal.   Vitals reviewed.      Assessment:       1. Acute UTI    2. UTI symptoms              Office Visit on 09/12/2022   Component Date Value Ref Range Status    POC Blood, Urine 09/12/2022 Positive (A)  Negative Final    POC Bilirubin, Urine 09/12/2022 Negative  Negative Final    POC Urobilinogen, Urine 09/12/2022 0.2  0.1 - 1.1 Final    POC Ketones, Urine 09/12/2022 Negative  Negative Final    POC Protein, Urine 09/12/2022 Negative  Negative Final    POC Nitrates, Urine 09/12/2022 Negative  Negative Final    POC Glucose, Urine 09/12/2022 Negative  " Negative Final    pH, UA 09/12/2022 5.5   Final    POC Specific Gravity, Urine 09/12/2022 1.025  1.003 - 1.029 Final    POC Leukocytes, Urine 09/12/2022 Negative  Negative Final        No results found.   Plan:       Medication as prescribed.  Urine sent for C&S.  Maintain adequate hydration.  If any flank pain, fever or any symptoms immediately go to the ER.    Acute UTI  -     nitrofurantoin, macrocrystal-monohydrate, (MACROBID) 100 MG capsule; Take 1 capsule (100 mg total) by mouth 2 (two) times daily. for 7 days  Dispense: 14 capsule; Refill: 0    UTI symptoms  -     POCT Urinalysis, Dipstick, Automated, W/O Scope  -     Urine culture

## 2022-09-14 LAB — BACTERIA UR CULT: NORMAL

## 2022-09-22 ENCOUNTER — TELEPHONE (OUTPATIENT)
Dept: URGENT CARE | Facility: CLINIC | Age: 57
End: 2022-09-22
Payer: MEDICAID

## 2022-09-22 NOTE — TELEPHONE ENCOUNTER
----- Message from Kathleen Jernigan sent at 9/19/2022 12:24 PM CDT -----  Regarding: results  Patient has not received results on her culture, please call her back.  [phone]    Thank you!

## 2022-09-26 PROBLEM — Z13.5 SCREENING FOR DIABETIC RETINOPATHY: Status: RESOLVED | Noted: 2022-06-27 | Resolved: 2022-09-26

## 2022-10-25 ENCOUNTER — LAB VISIT (OUTPATIENT)
Dept: LAB | Facility: HOSPITAL | Age: 57
End: 2022-10-25
Payer: MEDICAID

## 2022-10-25 DIAGNOSIS — E11.9 TYPE 2 DIABETES MELLITUS WITHOUT COMPLICATION, WITHOUT LONG-TERM CURRENT USE OF INSULIN: ICD-10-CM

## 2022-10-25 LAB
ALBUMIN SERPL-MCNC: 4.1 GM/DL (ref 3.5–5)
ALBUMIN/GLOB SERPL: 1.5 RATIO (ref 1.1–2)
ALP SERPL-CCNC: 92 UNIT/L (ref 40–150)
ALT SERPL-CCNC: 19 UNIT/L (ref 0–55)
AST SERPL-CCNC: 17 UNIT/L (ref 5–34)
BILIRUBIN DIRECT+TOT PNL SERPL-MCNC: 0.6 MG/DL
BUN SERPL-MCNC: 14.1 MG/DL (ref 9.8–20.1)
CALCIUM SERPL-MCNC: 9.3 MG/DL (ref 8.4–10.2)
CHLORIDE SERPL-SCNC: 106 MMOL/L (ref 98–107)
CO2 SERPL-SCNC: 24 MMOL/L (ref 22–29)
CREAT SERPL-MCNC: 0.72 MG/DL (ref 0.55–1.02)
EST. AVERAGE GLUCOSE BLD GHB EST-MCNC: 128.4 MG/DL
GFR SERPLBLD CREATININE-BSD FMLA CKD-EPI: >60 MLS/MIN/1.73/M2
GLOBULIN SER-MCNC: 2.8 GM/DL (ref 2.4–3.5)
GLUCOSE SERPL-MCNC: 127 MG/DL (ref 74–100)
HBA1C MFR BLD: 6.1 %
POTASSIUM SERPL-SCNC: 4.7 MMOL/L (ref 3.5–5.1)
PROT SERPL-MCNC: 6.9 GM/DL (ref 6.4–8.3)
SODIUM SERPL-SCNC: 143 MMOL/L (ref 136–145)

## 2022-10-25 PROCEDURE — 83036 HEMOGLOBIN GLYCOSYLATED A1C: CPT

## 2022-10-25 PROCEDURE — 80053 COMPREHEN METABOLIC PANEL: CPT

## 2022-10-25 PROCEDURE — 36415 COLL VENOUS BLD VENIPUNCTURE: CPT

## 2022-11-01 ENCOUNTER — OFFICE VISIT (OUTPATIENT)
Dept: INTERNAL MEDICINE | Facility: CLINIC | Age: 57
End: 2022-11-01
Payer: MEDICAID

## 2022-11-01 DIAGNOSIS — E11.9 TYPE 2 DIABETES MELLITUS WITHOUT COMPLICATION, WITHOUT LONG-TERM CURRENT USE OF INSULIN: Primary | ICD-10-CM

## 2022-11-01 DIAGNOSIS — Z78.0 POST-MENOPAUSAL: ICD-10-CM

## 2022-11-01 DIAGNOSIS — Z13.21 ENCOUNTER FOR VITAMIN DEFICIENCY SCREENING: ICD-10-CM

## 2022-11-01 PROBLEM — Z12.11 SCREENING FOR MALIGNANT NEOPLASM OF COLON: Status: RESOLVED | Noted: 2022-06-27 | Resolved: 2022-11-01

## 2022-11-01 PROCEDURE — 1160F RVW MEDS BY RX/DR IN RCRD: CPT | Mod: CPTII,95,,

## 2022-11-01 PROCEDURE — 1159F MED LIST DOCD IN RCRD: CPT | Mod: CPTII,95,,

## 2022-11-01 PROCEDURE — 99214 OFFICE O/P EST MOD 30 MIN: CPT | Mod: 95,,,

## 2022-11-01 PROCEDURE — 1160F PR REVIEW ALL MEDS BY PRESCRIBER/CLIN PHARMACIST DOCUMENTED: ICD-10-PCS | Mod: CPTII,95,,

## 2022-11-01 PROCEDURE — 1159F PR MEDICATION LIST DOCUMENTED IN MEDICAL RECORD: ICD-10-PCS | Mod: CPTII,95,,

## 2022-11-01 PROCEDURE — 99214 PR OFFICE/OUTPT VISIT, EST, LEVL IV, 30-39 MIN: ICD-10-PCS | Mod: 95,,,

## 2022-11-01 RX ORDER — WITCH HAZEL 50 %
2000 PADS, MEDICATED (EA) TOPICAL DAILY
COMMUNITY
End: 2023-05-02 | Stop reason: ALTCHOICE

## 2022-11-01 RX ORDER — METFORMIN HYDROCHLORIDE 500 MG/1
500 TABLET, EXTENDED RELEASE ORAL DAILY
Qty: 90 TABLET | Refills: 1 | Status: SHIPPED | OUTPATIENT
Start: 2022-11-01 | End: 2023-05-02 | Stop reason: SDUPTHER

## 2022-11-01 NOTE — ASSESSMENT & PLAN NOTE
Lab Results   Component Value Date    HGBA1C 6.1 10/25/2022    HGBA1C 6.3 06/23/2022    HGBA1C 6.2 08/25/2021    HGBA1C 6.6 03/29/2021   at goal.     Continue metformin as prescribed.   Follow ADA diet.   Avoid soda, simple sweets, and limit rice/pasta/bread/starches and consume brown options when possible.    Maintain healthy weight with BMI goal <30.    Perform aerobic exercise for 150 minutes per week (or 5 days a week for 30 minutes each day).    Examine feet daily.    Obtain annual dilated eye exam.   Eye exam: 6/27/2022  Foot exam: 6/27/2022

## 2022-11-01 NOTE — PROGRESS NOTES
VISIT DATE: 22    PATIENT NAME: Alondra Plasencia  : 1965  MRN: [id number]     The patient location is: [place], [place]  The chief complaint leading to consultation is: Lab review    Visit type: audio only    Audio time with patient: 13 minutes  30 minutes of total time spent on the encounter, which includes face to face time and non-face to face time preparing to see the patient (eg, review of tests), Obtaining and/or reviewing separately obtained history, Documenting clinical information in the electronic or other health record, Independently interpreting results (not separately reported) and communicating results to the patient/family/caregiver, or Care coordination (not separately reported).     Each patient to whom he or she provides medical services by telemedicine is:  (1) informed of the relationship between the physician and patient and the respective role of any other health care provider with respect to management of the patient; and (2) notified that he or she may decline to receive medical services by telemedicine and may withdraw from such care at any time.    Reason for visit / Chief Complaint:  Follow-up and Labs Only       History of Present Illness (HPI):  Alondra Plasencia is a 57 y.o. White female presenting virtually by audio only for Follow-up and Labs Only. PMH of DM and GERD (controlled with diet).      Labs dated 10/25/2022 reviewed and discussed with patient.     [id number]-4.1. She is compliant metformin 500 mg daily.  Reports home capillary blood sugar is no higher than 130. No log presented in clinic.  Pt is currently on a plant based diet, with no dairy, and very limited meat. Has added daily exercises. Intentional weight loss of perri 23lbs noted since November. She states she walks at least 3 times per week. She states she is taking Vitamin B12 daily.    Denies smoking, drinking, or illicit drug use. Denies chest pain, shortness of breath, cough, headache, dizziness, weakness, abdominal  pain, nausea, vomiting, diarrhea, constipation, dysuria, SI, and HI.    Breast Cancer Screening - Scheduled: 11/28/2022.  Cervical Cancer Screening - Scheduled: 3/14/2023.  Osteoporosis Screening - Deferred due to age. Mother had osteoporosis, started on Ca 600 mg BID and vitamin D3 2000 IU daily due to plant based diet and no dairy intake.  Colon Cancer Screening - 7/10/2022-cologuard negative.         Review of Systems     Review of Systems   Constitutional: Negative.    HENT: Negative.     Eyes: Negative.    Respiratory: Negative.     Cardiovascular: Negative.    Gastrointestinal: Negative.    Endocrine: Negative.    Genitourinary: Negative.    Musculoskeletal: Negative.    Skin: Negative.    Allergic/Immunologic: Negative.    Neurological: Negative.    Hematological: Negative.    Psychiatric/Behavioral: Negative.     All other systems reviewed and are negative.    Medical / Social / Family History     Past Medical History:   Diagnosis Date    Diabetes mellitus, type 2     GERD (gastroesophageal reflux disease)     Obesity, unspecified          Past Surgical History:   Procedure Laterality Date    CHOLECYSTECTOMY      FOOT SURGERY           Social History  Alondra Plasencia's  reports that she has never smoked. She has never used smokeless tobacco. She reports that she does not currently use alcohol. She reports that she does not use drugs.    Family History  Alondra Plasencia's family history includes Depression in her brother; Diabetes type II in her father; Heart disease in her mother; Multiple sclerosis in her father.    Medications and Allergies     Medications  Outpatient Medications Marked as Taking for the 11/1/22 encounter (Office Visit) with LIBIA Gregorio   Medication Sig Dispense Refill    calcium-vitamin D 600 mg-10 mcg (400 unit) Tab Take 1 tablet by mouth once daily.      cyanocobalamin 2000 MCG tablet Take 2,000 mcg by mouth once daily.      [DISCONTINUED] metFORMIN (GLUCOPHAGE-XR) 500 MG ER 24hr  tablet Take 1 tablet (500 mg total) by mouth once daily. 90 tablet 1       Allergies  Review of patient's allergies indicates:  No Known Allergies    Physical Examination   No vitals due to virtual visit.  Physical Exam  Pulmonary:      Effort: Pulmonary effort is normal.   Neurological:      General: No focal deficit present.      Mental Status: She is alert and oriented to person, place, and time.   Psychiatric:         Mood and Affect: Mood normal.         Behavior: Behavior normal.         Thought Content: Thought content normal.         Judgment: Judgment normal.         Results     Lab Results   Component Value Date    WBC 7.0 06/23/2022    RBC 4.76 06/23/2022    HGB 13.4 06/23/2022    HCT 42.4 06/23/2022    MCV 89.1 06/23/2022    MCH 28.2 06/23/2022    MCHC 31.6 (L) 06/23/2022    RDW 12.4 06/23/2022     06/23/2022    MPV 11.6 06/23/2022     Lab Results   Component Value Date     10/25/2022    K 4.7 10/25/2022    CO2 24 10/25/2022    BUN 14.1 10/25/2022    CREATININE 0.72 10/25/2022    CALCIUM 9.3 10/25/2022    ALBUMIN 4.1 10/25/2022    BILITOT 0.6 10/25/2022    ALKPHOS 92 10/25/2022    AST 17 10/25/2022    ALT 19 10/25/2022    EGFRIFAFRICA 88 (L) 08/25/2021    EGFRNONAA >60 06/23/2022     Lab Results   Component Value Date    TSH 3.7153 06/23/2022     Lab Results   Component Value Date    CHOL 189 06/23/2022    HDL 52 06/23/2022    .00 06/23/2022    TRIG 120 06/23/2022     Lab Results   Component Value Date    PHUR 5.5 09/12/2022    PROTEINUA Negative 10/25/2022    GLUCUA Negative 04/05/2021    KETONESU Negative 04/05/2021    OCCULTUA Negative 04/05/2021    NITRITE Negative 04/05/2021    LEUKOCYTESUR 25 (A) 10/25/2022     Lab Results   Component Value Date    CREATRANDUR 130.3 (H) 10/25/2022     Lab Results   Component Value Date    [id number] 49.9 06/23/2022     Lab Results   Component Value Date    HIV Nonreactive 06/23/2022    HEPAIGM Nonreactive 06/23/2022    HEPBCOREM Nonreactive  06/23/2022    HEPCAB Nonreactive 06/23/2022     Lab Results   Component Value Date    COLOGUARD Negative 07/10/2022         Assessment and Plan (including Health Maintenance)     Health Maintenance Due   Topic Date Due    Cervical Cancer Screening  Never done    COVID-19 Vaccine (1) Never done    Mammogram  Never done    Influenza Vaccine (1) Never done       Problem List Items Addressed This Visit          Renal/    Post-menopausal    Current Assessment & Plan     Dexa bone density scan ordered.         Relevant Orders    DXA Bone Density Spine And Hip       Endocrine    Type 2 diabetes mellitus without complication, without long-term current use of insulin - Primary    Current Assessment & Plan     Lab Results   Component Value Date    HGBA1C 6.1 10/25/2022    HGBA1C 6.3 06/23/2022    HGBA1C 6.2 08/25/2021    HGBA1C 6.6 03/29/2021   at goal.     Continue metformin as prescribed.   Follow ADA diet.   Avoid soda, simple sweets, and limit rice/pasta/bread/starches and consume brown options when possible.    Maintain healthy weight with BMI goal <30.    Perform aerobic exercise for 150 minutes per week (or 5 days a week for 30 minutes each day).    Examine feet daily.    Obtain annual dilated eye exam.   Eye exam: 6/27/2022  Foot exam: 6/27/2022          Relevant Medications    metFORMIN (GLUCOPHAGE-XR) 500 MG ER 24hr tablet    Other Relevant Orders    CBC Auto Differential    Comprehensive Metabolic Panel    Hemoglobin A1C    Lipid Panel    Microalbumin/Creatinine Ratio, Urine    Urinalysis, Reflex to Urine Culture Urine, Clean Catch     Other Visit Diagnoses       Encounter for vitamin deficiency screening        Relevant Orders    Vitamin D    Vitamin B12    Folate            Health Maintenance Topics with due status: Not Due       Topic Last Completion Date    Lipid Panel 06/23/2022    Colorectal Cancer Screening 07/10/2022       Future Appointments   Date Time Provider Department Center   11/28/2022 10:45 AM  [facility] MAMMO2 [facility] MAMMO [place] [facility]   3/14/2023  9:10 AM Virginia Caceres, DIANE [facility] GYN [place] [facility]            Signature:  LIBIA Gregorio  [facility]  [address] [id number]    Date of encounter: 11/1/22

## 2023-03-02 ENCOUNTER — PATIENT OUTREACH (OUTPATIENT)
Dept: ADMINISTRATIVE | Facility: HOSPITAL | Age: 58
End: 2023-03-02
Payer: MEDICAID

## 2023-03-02 NOTE — PROGRESS NOTES
Population Health Outreach. Chart Review.   The following below are needed for Health Maintenance.    Breast Cancer Screening  Cervical Cancer Screening. Pt has GYN appt 3/14/2023.  Next pcp f/u 5/2/2023

## 2023-04-28 ENCOUNTER — APPOINTMENT (OUTPATIENT)
Dept: LAB | Facility: HOSPITAL | Age: 58
End: 2023-04-28
Payer: MEDICAID

## 2023-05-02 ENCOUNTER — OFFICE VISIT (OUTPATIENT)
Dept: INTERNAL MEDICINE | Facility: CLINIC | Age: 58
End: 2023-05-02
Payer: MEDICAID

## 2023-05-02 VITALS
DIASTOLIC BLOOD PRESSURE: 63 MMHG | SYSTOLIC BLOOD PRESSURE: 100 MMHG | RESPIRATION RATE: 18 BRPM | WEIGHT: 164.19 LBS | HEART RATE: 72 BPM | HEIGHT: 65 IN | BODY MASS INDEX: 27.36 KG/M2 | TEMPERATURE: 98 F

## 2023-05-02 DIAGNOSIS — Z13.21 ENCOUNTER FOR VITAMIN DEFICIENCY SCREENING: ICD-10-CM

## 2023-05-02 DIAGNOSIS — Z13.0 SCREENING FOR IRON DEFICIENCY ANEMIA: ICD-10-CM

## 2023-05-02 DIAGNOSIS — E78.2 MIXED HYPERLIPIDEMIA: ICD-10-CM

## 2023-05-02 DIAGNOSIS — Z12.4 CERVICAL CANCER SCREENING: ICD-10-CM

## 2023-05-02 DIAGNOSIS — J34.89 DRY NARES: ICD-10-CM

## 2023-05-02 DIAGNOSIS — E11.9 TYPE 2 DIABETES MELLITUS WITHOUT COMPLICATION, WITHOUT LONG-TERM CURRENT USE OF INSULIN: Primary | ICD-10-CM

## 2023-05-02 DIAGNOSIS — Z13.29 SCREENING FOR THYROID DISORDER: ICD-10-CM

## 2023-05-02 PROCEDURE — 1159F PR MEDICATION LIST DOCUMENTED IN MEDICAL RECORD: ICD-10-PCS | Mod: CPTII,,,

## 2023-05-02 PROCEDURE — 1159F MED LIST DOCD IN RCRD: CPT | Mod: CPTII,,,

## 2023-05-02 PROCEDURE — 3078F PR MOST RECENT DIASTOLIC BLOOD PRESSURE < 80 MM HG: ICD-10-PCS | Mod: CPTII,,,

## 2023-05-02 PROCEDURE — 3061F PR NEG MICROALBUMINURIA RESULT DOCUMENTED/REVIEW: ICD-10-PCS | Mod: CPTII,,,

## 2023-05-02 PROCEDURE — 1160F RVW MEDS BY RX/DR IN RCRD: CPT | Mod: CPTII,,,

## 2023-05-02 PROCEDURE — 1160F PR REVIEW ALL MEDS BY PRESCRIBER/CLIN PHARMACIST DOCUMENTED: ICD-10-PCS | Mod: CPTII,,,

## 2023-05-02 PROCEDURE — 3066F NEPHROPATHY DOC TX: CPT | Mod: CPTII,,,

## 2023-05-02 PROCEDURE — 99214 OFFICE O/P EST MOD 30 MIN: CPT | Mod: PBBFAC

## 2023-05-02 PROCEDURE — 3078F DIAST BP <80 MM HG: CPT | Mod: CPTII,,,

## 2023-05-02 PROCEDURE — 3008F BODY MASS INDEX DOCD: CPT | Mod: CPTII,,,

## 2023-05-02 PROCEDURE — 3074F SYST BP LT 130 MM HG: CPT | Mod: CPTII,,,

## 2023-05-02 PROCEDURE — 99214 PR OFFICE/OUTPT VISIT, EST, LEVL IV, 30-39 MIN: ICD-10-PCS | Mod: S$PBB,,,

## 2023-05-02 PROCEDURE — 99214 OFFICE O/P EST MOD 30 MIN: CPT | Mod: S$PBB,,,

## 2023-05-02 PROCEDURE — 3066F PR DOCUMENTATION OF TREATMENT FOR NEPHROPATHY: ICD-10-PCS | Mod: CPTII,,,

## 2023-05-02 PROCEDURE — 3074F PR MOST RECENT SYSTOLIC BLOOD PRESSURE < 130 MM HG: ICD-10-PCS | Mod: CPTII,,,

## 2023-05-02 PROCEDURE — 3061F NEG MICROALBUMINURIA REV: CPT | Mod: CPTII,,,

## 2023-05-02 PROCEDURE — 3008F PR BODY MASS INDEX (BMI) DOCUMENTED: ICD-10-PCS | Mod: CPTII,,,

## 2023-05-02 RX ORDER — METFORMIN HYDROCHLORIDE 500 MG/1
500 TABLET, EXTENDED RELEASE ORAL DAILY
Qty: 90 TABLET | Refills: 1 | Status: SHIPPED | OUTPATIENT
Start: 2023-05-02 | End: 2023-11-02 | Stop reason: SDUPTHER

## 2023-05-02 RX ORDER — MUPIROCIN 20 MG/G
OINTMENT TOPICAL 3 TIMES DAILY
Qty: 15 G | Refills: 1 | Status: SHIPPED | OUTPATIENT
Start: 2023-05-02 | End: 2023-11-02

## 2023-05-02 NOTE — ASSESSMENT & PLAN NOTE
Lab Results   Component Value Date    .00 04/28/2023       Lab Results   Component Value Date    TRIG 145 (H) 04/28/2023       Lab Results   Component Value Date    HDL 65 (H) 04/28/2023        Lab Results   Component Value Date    CHOL 234 (H) 04/28/2023      If LDL elevated at next office visit, will initiate statin therapy.  Follow a low cholesterol, low saturated fat diet with less than 200 mg of cholesterol a day.   Avoid fried foods and high saturated fats.  Add flax seed or fish oil supplements to diet.   Increase dietary fiber.   Regular exercise improves cholesterol levels.  Physical activity 5 times a week for 30 minutes per day (or 150 minutes per week).   Stressed importance of dietary modifications.

## 2023-05-02 NOTE — ASSESSMENT & PLAN NOTE
Lab Results   Component Value Date    HGBA1C 6.6 04/28/2023    HGBA1C 6.1 10/25/2022    HGBA1C 6.3 06/23/2022    HGBA1C 6.2 08/25/2021     Continue  as prescribed.   Follow ADA diet.   Check blood glucose twice daily and as needed. Bring log to every office visit.  Avoid soda, simple sweets, and limit rice/pasta/bread/starches and consume brown options when possible.    Maintain healthy weight with BMI goal <30.    Perform aerobic exercise for 150 minutes per week (or 5 days a week for 30 minutes each day).    Examine feet daily.    Obtain annual dilated eye exam.   Kidney Protection: ACEI/ARB  Statin Therapy: (LDL)  Eye exam:   Foot exam:

## 2023-05-02 NOTE — PATIENT INSTRUCTIONS
Call Centralized Scheduling at [phone] to schedule mammogram.    REMINDER: Please complete labs within 1 week of appointment.   Please complete satisfaction survey when received. Thank you.

## 2023-05-02 NOTE — PROGRESS NOTES
PATIENT NAME: Alondra Plasencia  : 1965  DATE: 23  MRN: [id number]          Reason for Visit/Chief Complaint   Follow-up and Diabetes       History of Present Illness (HPI)     Alondra Plasencia is a 58 y.o. White female presenting in clinic today for Follow-up and Diabetes. PMH of DM and GERD (controlled with diet).      All pertinent labs dated 2023 and diagnotic tests reviewed and discussed with patient.     SxzN2m-2.6 - at goal. She is compliant metformin 500 mg daily.  Reports home capillary blood sugar is no higher than 130. No log presented in clinic.  She reports that she started to eat meat again.      She reports periodically have dry nares and she had used Bactroban in the past. She is requesting refill.     Denies smoking, drinking, or illicit drug use. Denies chest pain, shortness of breath, cough, headache, dizziness, weakness, abdominal pain, nausea, vomiting, diarrhea, constipation, dysuria, SI, and HI.    Breast Cancer Screening - Scheduled: 2022.  Cervical Cancer Screening - Scheduled: 3/14/2023.  Osteoporosis Screening - Bone scan ordered 2022.   Colon Cancer Screening - 7/10/2022-cologuard negative    Review of Systems     Review of Systems   Constitutional: Negative.    HENT: Negative.          Dry nares   Eyes: Negative.    Respiratory: Negative.     Cardiovascular: Negative.    Gastrointestinal: Negative.    Endocrine: Negative.    Genitourinary: Negative.    Musculoskeletal: Negative.    Skin: Negative.    Allergic/Immunologic: Negative.    Neurological: Negative.    Hematological: Negative.    Psychiatric/Behavioral: Negative.     All other systems reviewed and are negative.    Medical / Social / Family History     Past Medical History:   Diagnosis Date    Diabetes mellitus, type 2     GERD (gastroesophageal reflux disease)     Obesity, unspecified          Past Surgical History:   Procedure Laterality Date    CHOLECYSTECTOMY      FOOT SURGERY           Social  "History  Alondra Plasencia's  reports that she has never smoked. She has never used smokeless tobacco. She reports that she does not currently use alcohol. She reports that she does not use drugs.    Family History  Alondra Plasencia's family history includes Depression in her brother; Diabetes type II in her father; Heart disease in her mother; Multiple sclerosis in her father.    Medications and Allergies     Medications  Current Outpatient Medications   Medication Instructions    calcium-vitamin D 600 mg-10 mcg (400 unit) Tab 1 tablet, Oral, Daily    metFORMIN (GLUCOPHAGE-XR) 500 mg, Oral, Daily    mupirocin (BACTROBAN) 2 % ointment Topical (Top), 3 times daily       Allergies  Review of patient's allergies indicates:  No Known Allergies    Physical Examination   Visit Vitals  /63 (BP Location: Right arm, Patient Position: Sitting)   Pulse 72   Temp 98.1 °F (36.7 °C)   Resp 18   Ht 5' 5" (1.651 m)   Wt 74.5 kg (164 lb 3.2 oz)   BMI 27.32 kg/m²     Physical Exam  Vitals reviewed.   Constitutional:       Appearance: Normal appearance. She is normal weight.   HENT:      Head: Normocephalic and atraumatic.      Right Ear: External ear normal.      Left Ear: External ear normal.      Nose: Nose normal.      Mouth/Throat:      Mouth: Mucous membranes are moist.      Pharynx: Oropharynx is clear.   Eyes:      Extraocular Movements: Extraocular movements intact.      Conjunctiva/sclera: Conjunctivae normal.      Pupils: Pupils are equal, round, and reactive to light.   Cardiovascular:      Rate and Rhythm: Normal rate and regular rhythm.      Pulses: Normal pulses.      Heart sounds: Normal heart sounds.   Pulmonary:      Effort: Pulmonary effort is normal.      Breath sounds: Normal breath sounds.   Abdominal:      General: Bowel sounds are normal.      Palpations: Abdomen is soft.   Musculoskeletal:         General: Normal range of motion.      Cervical back: Normal range of motion and neck supple.   Skin:     General: " Skin is warm and dry.      Capillary Refill: Capillary refill takes less than 2 seconds.   Neurological:      General: No focal deficit present.      Mental Status: She is alert and oriented to person, place, and time.   Psychiatric:         Mood and Affect: Mood normal.         Behavior: Behavior normal.         Thought Content: Thought content normal.         Judgment: Judgment normal.         Results     Lab Results   Component Value Date    WBC 9.6 04/28/2023    RBC 4.82 04/28/2023    HGB 13.4 04/28/2023    HCT 42.0 04/28/2023    MCV 87.1 04/28/2023    MCH 27.8 04/28/2023    MCHC 31.9 (L) 04/28/2023    RDW 12.6 04/28/2023     04/28/2023    MPV 11.3 (H) 04/28/2023      Lab Results   Component Value Date     04/28/2023    K 4.0 04/28/2023    CHLORIDE 105 04/28/2023    CO2 27 04/28/2023    GLUCOSE 151 (H) 04/28/2023    BUN 13.4 04/28/2023    CREATININE 0.84 04/28/2023    LABPROT 7.8 04/28/2023    ALBUMIN 4.0 04/28/2023    BILITOT 0.4 04/28/2023    ALKPHOS 109 04/28/2023    AST 18 04/28/2023    ALT 22 04/28/2023    EGFRNORACEVR >60 04/28/2023     Lab Results   Component Value Date    TSH 3.7153 06/23/2022     Lab Results   Component Value Date    CHOL 234 (H) 04/28/2023    HDL 65 (H) 04/28/2023    .00 04/28/2023    TRIG 145 (H) 04/28/2023     Lab Results   Component Value Date    COLORUA Light-Yellow 04/28/2023    SGUA 1.017 04/28/2023    PROTEINUA Negative 04/28/2023    GLUCOSEUA Normal 04/28/2023    BILIRUBINUA Negative 04/28/2023    BLOODUA Trace (A) 04/28/2023    WBCUA 0-5 04/28/2023    RBCUA 0-5 04/28/2023    BACTERIA None Seen 04/28/2023    NITRITESUA Negative 04/28/2023    LEUKOCYTESUR Negative 04/28/2023    UROBILINOGEN Normal 04/28/2023     Lab Results   Component Value Date    CREATRANDUR 87.4 04/28/2023    MICALBCREAT 6.2 04/28/2023     Lab Results   Component Value Date    [id number] 49.5 04/28/2023    FOLATE 17.4 04/28/2023     Lab Results   Component Value Date    HIV Nonreactive  06/23/2022    HEPAIGM Nonreactive 06/23/2022    HEPBCOREM Nonreactive 06/23/2022    HEPCAB Nonreactive 06/23/2022     Lab Results   Component Value Date    COLOGUARD Negative 07/10/2022     Assessment and Plan (including Health Maintenance)     Problem List Items Addressed This Visit          ENT    Dry nares    Current Assessment & Plan     Apply mupirocen to nares as needed.           Relevant Medications    mupirocin (BACTROBAN) 2 % ointment       Cardiac/Vascular    Mixed hyperlipidemia    Current Assessment & Plan     Lab Results   Component Value Date    .00 04/28/2023       Lab Results   Component Value Date    TRIG 145 (H) 04/28/2023       Lab Results   Component Value Date    HDL 65 (H) 04/28/2023      Lab Results   Component Value Date    CHOL 234 (H) 04/28/2023      If LDL elevated at next office visit, will initiate statin therapy.  Follow a low cholesterol, low saturated fat diet with less than 200 mg of cholesterol a day.   Avoid fried foods and high saturated fats.  Add flax seed or fish oil supplements to diet.   Increase dietary fiber.   Regular exercise improves cholesterol levels.  Physical activity 5 times a week for 30 minutes per day (or 150 minutes per week).   Stressed importance of dietary modifications.          Relevant Orders    Lipid Panel       Renal/    Cervical cancer screening    Current Assessment & Plan     Referral to [facility].            Relevant Orders    Ambulatory referral/consult to Gynecology       Endocrine    Type 2 diabetes mellitus without complication, without long-term current use of insulin - Primary    Current Assessment & Plan     Lab Results   Component Value Date    HGBA1C 6.6 04/28/2023    HGBA1C 6.1 10/25/2022    HGBA1C 6.3 06/23/2022    HGBA1C 6.2 08/25/2021   Continue  as prescribed.   Follow ADA diet.   Check blood glucose twice daily and as needed. Bring log to every office visit.  Avoid soda, simple sweets, and limit rice/pasta/bread/starches and  consume brown options when possible.    Maintain healthy weight with BMI goal <30.    Perform aerobic exercise for 150 minutes per week (or 5 days a week for 30 minutes each day).    Examine feet daily.    Obtain annual dilated eye exam.   Kidney Protection: ACEI/ARB  Statin Therapy: (LDL)  Eye exam:   Foot exam:           Relevant Medications    metFORMIN (GLUCOPHAGE-XR) 500 MG ER 24hr tablet    Other Relevant Orders    Urinalysis, Reflex to Urine Culture    Microalbumin/Creatinine Ratio, Urine    Hemoglobin A1C    Comprehensive Metabolic Panel    CBC Auto Differential    BMI 27.0-27.9,adult    Current Assessment & Plan     Body mass index is 27.32 kg/m². (At goal).             Other Visit Diagnoses       Encounter for vitamin deficiency screening        Relevant Orders    Vitamin D    Screening for thyroid disorder        Relevant Orders    TSH    T4, Free    Screening for iron deficiency anemia        Relevant Orders    Iron and TIBC    Ferritin             Health Maintenance Due   Topic Date Due    Cervical Cancer Screening  Never done    COVID-19 Vaccine (1) Never done    Pneumococcal Vaccines (Age 0-64) (1 - PCV) Never done    Mammogram  Never done    Low Dose Statin  Never done    Foot Exam  06/27/2023     Tests to Keep You Healthy    Mammogram: ORDERED BUT NOT SCHEDULED  Eye Exam: Met on 6/27/2022  Colon Cancer Screening: Met on 7/10/2022  Cervical Cancer Screening: DUE  Last HbA1c < 8 (04/28/2023): Yes      Health Maintenance Topics with due status: Not Due       Topic Last Completion Date    Eye Exam 06/27/2022    Colorectal Cancer Screening 07/10/2022    Diabetes Urine Screening 04/28/2023    Lipid Panel 04/28/2023    Hemoglobin A1c 04/28/2023    Influenza Vaccine Not Due       Future Appointments   Date Time Provider Department Center   11/2/2023  8:30 AM LIBIA Gregorio [facility] KATHARINA Mendoza        Follow up in about 6 months (around 11/2/2023) for F2F, Follow up, Med check.  RTC  PRN.          Signature:        LIBIA Gregorio  [facility]  [address] [facility] [id number]    Date of encounter: 5/2/23

## 2023-05-12 ENCOUNTER — PATIENT MESSAGE (OUTPATIENT)
Dept: INTERNAL MEDICINE | Facility: CLINIC | Age: 58
End: 2023-05-12
Payer: MEDICAID

## 2023-05-19 ENCOUNTER — TELEPHONE (OUTPATIENT)
Dept: INTERNAL MEDICINE | Facility: CLINIC | Age: 58
End: 2023-05-19
Payer: MEDICAID

## 2023-05-22 ENCOUNTER — TELEPHONE (OUTPATIENT)
Dept: INTERNAL MEDICINE | Facility: CLINIC | Age: 58
End: 2023-05-22
Payer: MEDICAID

## 2023-05-23 NOTE — TELEPHONE ENCOUNTER
Attempted to contact pt at number listed in chart, no answer. Left a VM to call back to [facility]. Pt letter mailed

## 2023-06-14 ENCOUNTER — PATIENT MESSAGE (OUTPATIENT)
Dept: ADMINISTRATIVE | Facility: HOSPITAL | Age: 58
End: 2023-06-14
Payer: MEDICAID

## 2023-07-10 ENCOUNTER — PATIENT MESSAGE (OUTPATIENT)
Dept: ADMINISTRATIVE | Facility: HOSPITAL | Age: 58
End: 2023-07-10
Payer: MEDICAID

## 2023-07-31 DIAGNOSIS — Z12.31 BREAST CANCER SCREENING BY MAMMOGRAM: Primary | ICD-10-CM

## 2023-08-17 ENCOUNTER — HOSPITAL ENCOUNTER (OUTPATIENT)
Dept: RADIOLOGY | Facility: HOSPITAL | Age: 58
Discharge: HOME | End: 2023-08-17
Payer: MEDICAID

## 2023-08-17 DIAGNOSIS — Z12.31 BREAST CANCER SCREENING BY MAMMOGRAM: ICD-10-CM

## 2023-08-17 PROCEDURE — 77063 BREAST TOMOSYNTHESIS BI: CPT | Mod: 26,,, | Performed by: RADIOLOGY

## 2023-08-17 PROCEDURE — 77063 MAMMO DIGITAL SCREENING BILAT WITH TOMO: ICD-10-PCS | Mod: 26,,, | Performed by: RADIOLOGY

## 2023-08-17 PROCEDURE — 77067 MAMMO DIGITAL SCREENING BILAT WITH TOMO: ICD-10-PCS | Mod: 26,,, | Performed by: RADIOLOGY

## 2023-08-17 PROCEDURE — 77067 SCR MAMMO BI INCL CAD: CPT | Mod: TC

## 2023-08-17 PROCEDURE — 77067 SCR MAMMO BI INCL CAD: CPT | Mod: 26,,, | Performed by: RADIOLOGY

## 2023-10-16 ENCOUNTER — PATIENT MESSAGE (OUTPATIENT)
Dept: ADMINISTRATIVE | Facility: HOSPITAL | Age: 58
End: 2023-10-16
Payer: MEDICAID

## 2023-11-01 ENCOUNTER — LAB VISIT (OUTPATIENT)
Dept: LAB | Facility: HOSPITAL | Age: 58
End: 2023-11-01
Payer: MEDICAID

## 2023-11-01 DIAGNOSIS — Z13.21 ENCOUNTER FOR VITAMIN DEFICIENCY SCREENING: ICD-10-CM

## 2023-11-01 DIAGNOSIS — E11.9 TYPE 2 DIABETES MELLITUS WITHOUT COMPLICATION, WITHOUT LONG-TERM CURRENT USE OF INSULIN: ICD-10-CM

## 2023-11-01 DIAGNOSIS — E78.2 MIXED HYPERLIPIDEMIA: ICD-10-CM

## 2023-11-01 DIAGNOSIS — Z13.0 SCREENING FOR IRON DEFICIENCY ANEMIA: ICD-10-CM

## 2023-11-01 DIAGNOSIS — Z13.29 SCREENING FOR THYROID DISORDER: ICD-10-CM

## 2023-11-01 LAB
ALBUMIN SERPL-MCNC: 4 G/DL (ref 3.4–4.8)
ALBUMIN/GLOB SERPL: 1.2 RATIO (ref 1.1–2)
ALP SERPL-CCNC: 105 UNIT/L (ref 40–150)
ALT SERPL-CCNC: 22 UNIT/L (ref 0–55)
APPEARANCE UR: CLEAR
AST SERPL-CCNC: 19 UNIT/L (ref 5–34)
BACTERIA #/AREA URNS AUTO: ABNORMAL /HPF
BASOPHILS # BLD AUTO: 0.04 X10(3)/MCL
BASOPHILS NFR BLD AUTO: 0.6 %
BILIRUB SERPL-MCNC: 0.4 MG/DL
BILIRUB UR QL STRIP.AUTO: NEGATIVE
BUN SERPL-MCNC: 12.6 MG/DL (ref 9.8–20.1)
CALCIUM SERPL-MCNC: 9.5 MG/DL (ref 8.4–10.2)
CHLORIDE SERPL-SCNC: 108 MMOL/L (ref 98–107)
CHOLEST SERPL-MCNC: 204 MG/DL
CHOLEST/HDLC SERPL: 4 {RATIO} (ref 0–5)
CO2 SERPL-SCNC: 27 MMOL/L (ref 23–31)
COLOR UR AUTO: ABNORMAL
CREAT SERPL-MCNC: 0.82 MG/DL (ref 0.55–1.02)
CREAT UR-MCNC: 71.9 MG/DL (ref 45–106)
DEPRECATED CALCIDIOL+CALCIFEROL SERPL-MC: 43 NG/ML (ref 30–80)
EOSINOPHIL # BLD AUTO: 0.16 X10(3)/MCL (ref 0–0.9)
EOSINOPHIL NFR BLD AUTO: 2.3 %
ERYTHROCYTE [DISTWIDTH] IN BLOOD BY AUTOMATED COUNT: 12.8 % (ref 11.5–17)
EST. AVERAGE GLUCOSE BLD GHB EST-MCNC: 137 MG/DL
FERRITIN SERPL-MCNC: 79.77 NG/ML (ref 4.63–204)
GFR SERPLBLD CREATININE-BSD FMLA CKD-EPI: >60 MLS/MIN/1.73/M2
GLOBULIN SER-MCNC: 3.3 GM/DL (ref 2.4–3.5)
GLUCOSE SERPL-MCNC: 137 MG/DL (ref 82–115)
GLUCOSE UR QL STRIP.AUTO: NORMAL
HBA1C MFR BLD: 6.4 %
HCT VFR BLD AUTO: 41 % (ref 37–47)
HDLC SERPL-MCNC: 56 MG/DL (ref 35–60)
HGB BLD-MCNC: 13.2 G/DL (ref 12–16)
HYALINE CASTS #/AREA URNS LPF: ABNORMAL /LPF
IMM GRANULOCYTES # BLD AUTO: 0.01 X10(3)/MCL (ref 0–0.04)
IMM GRANULOCYTES NFR BLD AUTO: 0.1 %
IRON SATN MFR SERPL: 24 % (ref 20–50)
IRON SERPL-MCNC: 75 UG/DL (ref 50–170)
KETONES UR QL STRIP.AUTO: NEGATIVE
LDLC SERPL CALC-MCNC: 120 MG/DL (ref 50–140)
LEUKOCYTE ESTERASE UR QL STRIP.AUTO: 25
LYMPHOCYTES # BLD AUTO: 2.99 X10(3)/MCL (ref 0.6–4.6)
LYMPHOCYTES NFR BLD AUTO: 43.5 %
MCH RBC QN AUTO: 28.8 PG (ref 27–31)
MCHC RBC AUTO-ENTMCNC: 32.2 G/DL (ref 33–36)
MCV RBC AUTO: 89.5 FL (ref 80–94)
MICROALBUMIN UR-MCNC: <5 UG/ML
MICROALBUMIN/CREAT RATIO PNL UR: NORMAL
MONOCYTES # BLD AUTO: 0.45 X10(3)/MCL (ref 0.1–1.3)
MONOCYTES NFR BLD AUTO: 6.5 %
MUCOUS THREADS URNS QL MICRO: ABNORMAL /LPF
NEUTROPHILS # BLD AUTO: 3.23 X10(3)/MCL (ref 2.1–9.2)
NEUTROPHILS NFR BLD AUTO: 47 %
NITRITE UR QL STRIP.AUTO: NEGATIVE
NRBC BLD AUTO-RTO: 0 %
PH UR STRIP.AUTO: 5.5 [PH]
PLATELET # BLD AUTO: 249 X10(3)/MCL (ref 130–400)
PMV BLD AUTO: 11.2 FL (ref 7.4–10.4)
POTASSIUM SERPL-SCNC: 4.7 MMOL/L (ref 3.5–5.1)
PROT SERPL-MCNC: 7.3 GM/DL (ref 5.8–7.6)
PROT UR QL STRIP.AUTO: NEGATIVE
RBC # BLD AUTO: 4.58 X10(6)/MCL (ref 4.2–5.4)
RBC #/AREA URNS AUTO: ABNORMAL /HPF
RBC UR QL AUTO: NEGATIVE
SODIUM SERPL-SCNC: 143 MMOL/L (ref 136–145)
SP GR UR STRIP.AUTO: 1.01 (ref 1–1.03)
SQUAMOUS #/AREA URNS LPF: ABNORMAL /HPF
T4 FREE SERPL-MCNC: 0.89 NG/DL (ref 0.7–1.48)
TIBC SERPL-MCNC: 240 UG/DL (ref 70–310)
TIBC SERPL-MCNC: 315 UG/DL (ref 250–450)
TRANSFERRIN SERPL-MCNC: 267 MG/DL (ref 180–382)
TRIGL SERPL-MCNC: 138 MG/DL (ref 37–140)
TSH SERPL-ACNC: 3.83 UIU/ML (ref 0.35–4.94)
UROBILINOGEN UR STRIP-ACNC: NORMAL
VLDLC SERPL CALC-MCNC: 28 MG/DL
WBC # SPEC AUTO: 6.88 X10(3)/MCL (ref 4.5–11.5)
WBC #/AREA URNS AUTO: ABNORMAL /HPF

## 2023-11-01 PROCEDURE — 80061 LIPID PANEL: CPT

## 2023-11-01 PROCEDURE — 36415 COLL VENOUS BLD VENIPUNCTURE: CPT

## 2023-11-01 PROCEDURE — 85025 COMPLETE CBC W/AUTO DIFF WBC: CPT

## 2023-11-01 PROCEDURE — 83036 HEMOGLOBIN GLYCOSYLATED A1C: CPT

## 2023-11-01 PROCEDURE — 80053 COMPREHEN METABOLIC PANEL: CPT

## 2023-11-01 PROCEDURE — 84443 ASSAY THYROID STIM HORMONE: CPT

## 2023-11-01 PROCEDURE — 81001 URINALYSIS AUTO W/SCOPE: CPT

## 2023-11-01 PROCEDURE — 82043 UR ALBUMIN QUANTITATIVE: CPT

## 2023-11-01 PROCEDURE — 84439 ASSAY OF FREE THYROXINE: CPT

## 2023-11-01 PROCEDURE — 82728 ASSAY OF FERRITIN: CPT

## 2023-11-01 PROCEDURE — 83550 IRON BINDING TEST: CPT

## 2023-11-01 PROCEDURE — 82306 VITAMIN D 25 HYDROXY: CPT

## 2023-11-02 ENCOUNTER — OFFICE VISIT (OUTPATIENT)
Dept: INTERNAL MEDICINE | Facility: CLINIC | Age: 58
End: 2023-11-02
Payer: MEDICAID

## 2023-11-02 ENCOUNTER — CLINICAL SUPPORT (OUTPATIENT)
Dept: INTERNAL MEDICINE | Facility: CLINIC | Age: 58
End: 2023-11-02
Payer: MEDICAID

## 2023-11-02 VITALS
DIASTOLIC BLOOD PRESSURE: 64 MMHG | TEMPERATURE: 98 F | HEIGHT: 65 IN | RESPIRATION RATE: 20 BRPM | SYSTOLIC BLOOD PRESSURE: 108 MMHG | OXYGEN SATURATION: 97 % | WEIGHT: 158.31 LBS | BODY MASS INDEX: 26.38 KG/M2 | HEART RATE: 88 BPM

## 2023-11-02 DIAGNOSIS — Z00.00 WELL ADULT EXAM: Primary | ICD-10-CM

## 2023-11-02 DIAGNOSIS — E11.9 TYPE 2 DIABETES MELLITUS WITHOUT COMPLICATION, WITHOUT LONG-TERM CURRENT USE OF INSULIN: ICD-10-CM

## 2023-11-02 DIAGNOSIS — Z12.4 CERVICAL CANCER SCREENING: ICD-10-CM

## 2023-11-02 DIAGNOSIS — E78.2 MIXED HYPERLIPIDEMIA: ICD-10-CM

## 2023-11-02 DIAGNOSIS — Z13.5 SCREENING FOR DIABETIC RETINOPATHY: ICD-10-CM

## 2023-11-02 DIAGNOSIS — Z53.20 OSTEOPOROSIS SCREENING DECLINED: ICD-10-CM

## 2023-11-02 PROBLEM — J34.89 DRY NARES: Status: RESOLVED | Noted: 2023-05-02 | Resolved: 2023-11-02

## 2023-11-02 LAB
LEFT EYE DM RETINOPATHY: NEGATIVE
RIGHT EYE DM RETINOPATHY: NEGATIVE

## 2023-11-02 PROCEDURE — 3066F PR DOCUMENTATION OF TREATMENT FOR NEPHROPATHY: ICD-10-PCS | Mod: CPTII,,,

## 2023-11-02 PROCEDURE — 3061F PR NEG MICROALBUMINURIA RESULT DOCUMENTED/REVIEW: ICD-10-PCS | Mod: CPTII,,,

## 2023-11-02 PROCEDURE — 3078F DIAST BP <80 MM HG: CPT | Mod: CPTII,,,

## 2023-11-02 PROCEDURE — 3044F HG A1C LEVEL LT 7.0%: CPT | Mod: CPTII,,,

## 2023-11-02 PROCEDURE — 3074F PR MOST RECENT SYSTOLIC BLOOD PRESSURE < 130 MM HG: ICD-10-PCS | Mod: CPTII,,,

## 2023-11-02 PROCEDURE — 3078F PR MOST RECENT DIASTOLIC BLOOD PRESSURE < 80 MM HG: ICD-10-PCS | Mod: CPTII,,,

## 2023-11-02 PROCEDURE — 99214 PR OFFICE/OUTPT VISIT, EST, LEVL IV, 30-39 MIN: ICD-10-PCS | Mod: S$PBB,,,

## 2023-11-02 PROCEDURE — 3074F SYST BP LT 130 MM HG: CPT | Mod: CPTII,,,

## 2023-11-02 PROCEDURE — 1160F PR REVIEW ALL MEDS BY PRESCRIBER/CLIN PHARMACIST DOCUMENTED: ICD-10-PCS | Mod: CPTII,,,

## 2023-11-02 PROCEDURE — 3061F NEG MICROALBUMINURIA REV: CPT | Mod: CPTII,,,

## 2023-11-02 PROCEDURE — 99214 OFFICE O/P EST MOD 30 MIN: CPT | Mod: S$PBB,,,

## 2023-11-02 PROCEDURE — 3008F PR BODY MASS INDEX (BMI) DOCUMENTED: ICD-10-PCS | Mod: CPTII,,,

## 2023-11-02 PROCEDURE — 1159F PR MEDICATION LIST DOCUMENTED IN MEDICAL RECORD: ICD-10-PCS | Mod: CPTII,,,

## 2023-11-02 PROCEDURE — 3066F NEPHROPATHY DOC TX: CPT | Mod: CPTII,,,

## 2023-11-02 PROCEDURE — 1159F MED LIST DOCD IN RCRD: CPT | Mod: CPTII,,,

## 2023-11-02 PROCEDURE — 92228 IMG RTA DETC/MNTR DS PHY/QHP: CPT | Mod: PBBFAC

## 2023-11-02 PROCEDURE — 1160F RVW MEDS BY RX/DR IN RCRD: CPT | Mod: CPTII,,,

## 2023-11-02 PROCEDURE — 3044F PR MOST RECENT HEMOGLOBIN A1C LEVEL <7.0%: ICD-10-PCS | Mod: CPTII,,,

## 2023-11-02 PROCEDURE — 99214 OFFICE O/P EST MOD 30 MIN: CPT | Mod: PBBFAC

## 2023-11-02 PROCEDURE — 3008F BODY MASS INDEX DOCD: CPT | Mod: CPTII,,,

## 2023-11-02 RX ORDER — METFORMIN HYDROCHLORIDE 500 MG/1
500 TABLET, EXTENDED RELEASE ORAL DAILY
Qty: 90 TABLET | Refills: 1 | Status: SHIPPED | OUTPATIENT
Start: 2023-11-02 | End: 2024-11-01

## 2023-11-02 NOTE — ASSESSMENT & PLAN NOTE
Lab Results   Component Value Date    .00 11/01/2023       Lab Results   Component Value Date    TRIG 138 11/01/2023       Lab Results   Component Value Date    HDL 56 11/01/2023        Lab Results   Component Value Date    CHOL 204 (H) 11/01/2023      Declines statin therapy - will repeat in 6 months.  Follow a low cholesterol, low saturated fat diet with less than 200 mg of cholesterol a day.   Avoid fried foods and high saturated fats.  Add flax seed or fish oil supplements to diet.   Increase dietary fiber.   Regular exercise improves cholesterol levels.  Physical activity 5 times a week for 30 minutes per day (or 150 minutes per week).   Stressed importance of dietary modifications.

## 2023-11-02 NOTE — PROGRESS NOTES
PATIENT NAME: Alondra Plasencia  : 1965  DATE: 23  MRN: [id number]          Reason for Visit/Chief Complaint   Follow-up, Labs Only, and Medication Refill       History of Present Illness (HPI)     Alondra Plasencia is a 58 y.o. White female presenting in clinic today for Follow-up, Labs Only, and Medication Refill. PMH of DM and GERD (controlled with diet).      All pertinent labs dated 2023 reviewed and discussed with patient.     [id number]-3.6 - at goal. She is compliant metformin 500 mg daily. Does not regularly check blood sugar.      Denies smoking, drinking, or illicit drug use. Denies chest pain, shortness of breath, cough, headache, dizziness, weakness, abdominal pain, nausea, vomiting, diarrhea, constipation, dysuria, SI, and HI.    Breast Cancer Screening - 2023 - Birads 1. Repeat annually.  Cervical Cancer Screening - Cancelled appt on 3/14/2023, has not been rescheduled. Re-referred to GYN clinic (2023).  Osteoporosis Screening - Declines Dexa scan.  Colon Cancer Screening - 7/10/2022-cologuard negative  Vaccinations: Declines all vaccines.  Eye exam - Fundus - 2023.  Dental Exam - Several years. List of local dentists provided to patient.         Review of Systems     Review of Systems   Constitutional: Negative.    HENT: Negative.     Eyes: Negative.    Respiratory: Negative.     Cardiovascular: Negative.    Gastrointestinal: Negative.    Endocrine: Negative.    Genitourinary: Negative.    Musculoskeletal: Negative.    Skin: Negative.    Allergic/Immunologic: Negative.    Neurological: Negative.    Hematological: Negative.    Psychiatric/Behavioral: Negative.     All other systems reviewed and are negative.      Medical / Social / Family History     Past Medical History:   Diagnosis Date    Diabetes mellitus, type 2     GERD (gastroesophageal reflux disease)     Obesity, unspecified          Past Surgical History:   Procedure Laterality Date    CHOLECYSTECTOMY      FOOT  "SURGERY           Social History  Alondra Plasencia's  reports that she has never smoked. She has never used smokeless tobacco. She reports that she does not currently use alcohol. She reports that she does not use drugs.    Family History  Alondra Plasencia's family history includes Depression in her brother; Diabetes type II in her father; Heart disease in her mother; Multiple sclerosis in her father.    Medications and Allergies     Medications  Current Outpatient Medications   Medication Instructions    metFORMIN (GLUCOPHAGE-XR) 500 mg, Oral, Daily       Allergies  Review of patient's allergies indicates:  No Known Allergies    Physical Examination   Visit Vitals  /64 (BP Location: Right arm, Patient Position: Sitting, BP Method: Large (Automatic))   Pulse 88   Temp 98 °F (36.7 °C) (Oral)   Resp 20   Ht 5' 5" (1.651 m)   Wt 71.8 kg (158 lb 4.6 oz)   SpO2 97%   BMI 26.34 kg/m²       Physical Exam  Vitals reviewed.   Constitutional:       Appearance: Normal appearance. She is normal weight.   HENT:      Head: Normocephalic and atraumatic.      Right Ear: External ear normal.      Left Ear: External ear normal.      Nose: Nose normal.      Mouth/Throat:      Mouth: Mucous membranes are moist.      Pharynx: Oropharynx is clear.   Eyes:      Extraocular Movements: Extraocular movements intact.      Conjunctiva/sclera: Conjunctivae normal.      Pupils: Pupils are equal, round, and reactive to light.   Cardiovascular:      Rate and Rhythm: Normal rate and regular rhythm.      Pulses: Normal pulses.           Dorsalis pedis pulses are 2+ on the right side and 2+ on the left side.        Posterior tibial pulses are 2+ on the right side and 2+ on the left side.      Heart sounds: Normal heart sounds.   Pulmonary:      Effort: Pulmonary effort is normal.      Breath sounds: Normal breath sounds.   Abdominal:      General: Bowel sounds are normal.      Palpations: Abdomen is soft.   Musculoskeletal:         General: Normal " range of motion.      Cervical back: Normal range of motion and neck supple.   Feet:      Right foot:      Protective Sensation: 6 sites tested.  6 sites sensed.      Skin integrity: Skin integrity normal.      Toenail Condition: Right toenails are normal.      Left foot:      Protective Sensation: 6 sites tested.  6 sites sensed.      Skin integrity: Skin integrity normal.      Toenail Condition: Left toenails are normal.      Comments:   Protective Sensation (w/ 10 gram monofilament):  Right: Intact  Left: Intact    Visual Inspection:  Normal -  Bilateral    Pedal Pulses:   Right: Present  Left: Present    Posterior Tibialis Pulses:   Right:Present  Left: Present       Skin:     General: Skin is warm and dry.      Capillary Refill: Capillary refill takes less than 2 seconds.   Neurological:      General: No focal deficit present.      Mental Status: She is alert and oriented to person, place, and time.   Psychiatric:         Mood and Affect: Mood normal.         Behavior: Behavior normal.         Thought Content: Thought content normal.         Judgment: Judgment normal.           Results     Lab Results   Component Value Date    WBC 6.88 11/01/2023    RBC 4.58 11/01/2023    HGB 13.2 11/01/2023    HCT 41.0 11/01/2023    MCV 89.5 11/01/2023    MCH 28.8 11/01/2023    MCHC 32.2 (L) 11/01/2023    RDW 12.8 11/01/2023     11/01/2023    MPV 11.2 (H) 11/01/2023      Lab Results   Component Value Date     11/01/2023    K 4.7 11/01/2023    CHLORIDE 108 (H) 11/01/2023    CO2 27 11/01/2023    GLUCOSE 137 (H) 11/01/2023    BUN 12.6 11/01/2023    CREATININE 0.82 11/01/2023    LABPROT 7.3 11/01/2023    ALBUMIN 4.0 11/01/2023    BILITOT 0.4 11/01/2023    ALKPHOS 105 11/01/2023    AST 19 11/01/2023    ALT 22 11/01/2023    EGFRNORACEVR >60 11/01/2023     Lab Results   Component Value Date    TSH 3.828 11/01/2023     Lab Results   Component Value Date    CHOL 204 (H) 11/01/2023    HDL 56 11/01/2023    .00  11/01/2023    TRIG 138 11/01/2023     Lab Results   Component Value Date    COLORUA Light-Yellow 11/01/2023    SGUA 1.013 11/01/2023    PROTEINUA Negative 11/01/2023    GLUCOSEUA Normal 11/01/2023    BILIRUBINUA Negative 11/01/2023    BLOODUA Negative 11/01/2023    WBCUA 0-5 11/01/2023    RBCUA 0-5 11/01/2023    BACTERIA None Seen 11/01/2023    NITRITESUA Negative 11/01/2023    LEUKOCYTESUR 25 (A) 11/01/2023    UROBILINOGEN Normal 11/01/2023     Lab Results   Component Value Date    CREATRANDUR 71.9 11/01/2023    MICALBCREAT  11/01/2023      Comment:      Unable to Calculate     Lab Results   Component Value Date    [id number] 43.0 11/01/2023    FOLATE 17.4 04/28/2023     Lab Results   Component Value Date    HIV Nonreactive 06/23/2022    HEPAIGM Nonreactive 06/23/2022    HEPBCOREM Nonreactive 06/23/2022    HEPCAB Nonreactive 06/23/2022     Lab Results   Component Value Date    COLOGUARD Negative 07/10/2022     Assessment and Plan (including Health Maintenance)     Problem List Items Addressed This Visit          Cardiac/Vascular    Mixed hyperlipidemia    Current Assessment & Plan     Lab Results   Component Value Date    .00 11/01/2023       Lab Results   Component Value Date    TRIG 138 11/01/2023       Lab Results   Component Value Date    HDL 56 11/01/2023      Lab Results   Component Value Date    CHOL 204 (H) 11/01/2023      Declines statin therapy - will repeat in 6 months.  Follow a low cholesterol, low saturated fat diet with less than 200 mg of cholesterol a day.   Avoid fried foods and high saturated fats.  Add flax seed or fish oil supplements to diet.   Increase dietary fiber.   Regular exercise improves cholesterol levels.  Physical activity 5 times a week for 30 minutes per day (or 150 minutes per week).   Stressed importance of dietary modifications.          Relevant Orders    Lipid Panel       Renal/    Cervical cancer screening    Current Assessment & Plan     Patient cancelled appt on  3/14/2023 and has not re-scheduled.  Referral placed to [facility] GYN clinic to eval and treat.          Relevant Orders    Ambulatory referral/consult to Gynecology       Endocrine    Type 2 diabetes mellitus without complication, without long-term current use of insulin    Current Assessment & Plan     Lab Results   Component Value Date    HGBA1C 6.4 11/01/2023    HGBA1C 6.6 04/28/2023    HGBA1C 6.1 10/25/2022    HGBA1C 6.3 06/23/2022   Continue  as prescribed.   Follow ADA diet.   Check blood glucose twice daily and as needed. Bring log to every office visit.  Avoid soda, simple sweets, and limit rice/pasta/bread/starches and consume brown options when possible.    Maintain healthy weight with BMI goal <30.    Perform aerobic exercise for 150 minutes per week (or 5 days a week for 30 minutes each day).    Examine feet daily.    Obtain annual dilated eye exam.   Kidney Protection: Declines.  Statin Therapy: Declines.  Eye exam: Fundus - 11/2/2023.  Foot exam:  11/2/2023.         Relevant Medications    metFORMIN (GLUCOPHAGE-XR) 500 MG ER 24hr tablet    Other Relevant Orders    CBC Auto Differential    Comprehensive Metabolic Panel    Hemoglobin A1C    Microalbumin/Creatinine Ratio, Urine    Urinalysis, Reflex to Urine Culture    BMI 26.0-26.9,adult    Current Assessment & Plan     Body mass index is 26.34 kg/m². (At goal).             Palliative Care    Osteoporosis screening declined    Current Assessment & Plan     Declines bone density scan.            Other    Well adult exam - Primary    Current Assessment & Plan     Wellness labs - 11/1/2023  Breast Cancer Screening - 8/17/2023 - Birads 1. Repeat annually.  Cervical Cancer Screening - Cancelled appt on 3/14/2023, has not been rescheduled. Re-referred to GYN clinic (11/2/2023).  Osteoporosis Screening - Declines Dexa scan.  Colon Cancer Screening - 7/10/2022-cologuard negative  Vaccinations: Declines all vaccines.  Eye exam - Fundus - 11/2/2023.  Dental Exam -  Several years. List of local dentists provided to patient.               Health Maintenance Due   Topic Date Due    Cervical Cancer Screening  Never done    COVID-19 Vaccine (1) Never done    RSV Vaccine (Age 60+) (1 - 1-dose 60+ series) Never done     Tests to Keep You Healthy    Mammogram: Met on 8/17/2023  Eye Exam: Met on 11/2/2023  Colon Cancer Screening: Met on 7/10/2022  Cervical Cancer Screening: DUE  Last HbA1c < 8 (11/01/2023): Yes      Health Maintenance Topics with due status: Not Due       Topic Last Completion Date    Colorectal Cancer Screening 07/10/2022    Mammogram 08/17/2023    Diabetes Urine Screening 11/01/2023    Lipid Panel 11/01/2023    Hemoglobin A1c 11/01/2023    Foot Exam 11/02/2023    Eye Exam 11/02/2023       No future appointments.  Patient left without checking out and getting next appt, staff to call her and schedule 6 month follow up appt.       Follow up in about 6 months (around 5/2/2024) for F2F, Follow up, Med check, Lab review, RTC PRN.        Signature:        LIBIA Gregorio  [facility]  [address] [facility] [id number]    Date of encounter: 11/2/23

## 2023-11-02 NOTE — ASSESSMENT & PLAN NOTE
Lab Results   Component Value Date    HGBA1C 6.4 11/01/2023    HGBA1C 6.6 04/28/2023    HGBA1C 6.1 10/25/2022    HGBA1C 6.3 06/23/2022     Continue  as prescribed.   Follow ADA diet.   Check blood glucose twice daily and as needed. Bring log to every office visit.  Avoid soda, simple sweets, and limit rice/pasta/bread/starches and consume brown options when possible.    Maintain healthy weight with BMI goal <30.    Perform aerobic exercise for 150 minutes per week (or 5 days a week for 30 minutes each day).    Examine feet daily.    Obtain annual dilated eye exam.   Kidney Protection: Declines.  Statin Therapy: Declines.  Eye exam: Fundus - 11/2/2023.  Foot exam:  11/2/2023.

## 2023-11-02 NOTE — PATIENT INSTRUCTIONS
REMINDER: Please complete labs within 1 week of appointment.   Please complete satisfaction survey when received. Thank you.    Bhaskar Cantu,     If you are due for any health screening(s) below please notify me so we can arrange them to be ordered and scheduled. Most healthy patients at your age complete them, but you are free to accept or refuse.     If you can't do it, I'll definitely understand. If you can, I'd certainly appreciate it!    Tests to Keep You Healthy    Mammogram: Met on 8/17/2023  Eye Exam: Met on 11/2/2023  Colon Cancer Screening: Met on 7/10/2022  Cervical Cancer Screening: DUE  Last HbA1c < 8 (11/01/2023): Yes      Your cervical cancer screening is due     Our records indicate that you may be overdue for your screening Pap smear. A Pap smear is an important health screening that can detect abnormal cells that can become cervical cancer. Cervical cancer screenings allow for early diagnosis and increase the likelihood of successful treatment.     The current recommendation for Pap smear screening is every 3-5 years for women at average risk. We encourage you to schedule your appointment with your womens health provider. Many women see a gynecologist for this screening, but some primary care providers also provide Pap screening.     If you recently had your Pap smear screening performed outside of [facility], please let your health care team know so that they can update your health record.      Your diabetic retinal eye exam is due     Diabetes is the #1 cause of blindness in the [place] - early detection before signs or symptoms develop can prevent debilitating blindness.     Our records indicate that you may be overdue for your annual diabetic eye exam. Eye screening can help identify patients at risk for developing vision loss which is common in diabetes. This simple screening is an important step to keeping you healthy and preventing complications from diabetes.     This recommended  diabetic eye exam should take place once per year and can prevent and treat diabetes complications in the eye before developing symptoms. This can be done with a special camera is used to take photographs of the back of your eye without having to dilate them, or you can see an eye doctor for a full dilated exam.     If you recently had your yearly diabetic eye exam performed outside of [facility], please let your Health care team know so that they can update your health record.

## 2023-11-02 NOTE — ASSESSMENT & PLAN NOTE
Wellness labs - 11/1/2023  Breast Cancer Screening - 8/17/2023 - Birads 1. Repeat annually.  Cervical Cancer Screening - Cancelled appt on 3/14/2023, has not been rescheduled. Re-referred to GYN clinic (11/2/2023).  Osteoporosis Screening - Declines Dexa scan.  Colon Cancer Screening - 7/10/2022-cologuard negative  Vaccinations: Declines all vaccines.  Eye exam - Fundus - 11/2/2023.  Dental Exam - Several years. List of local dentists provided to patient.

## 2023-11-02 NOTE — ASSESSMENT & PLAN NOTE
Patient cancelled appt on 3/14/2023 and has not re-scheduled.  Referral placed to [facility] GYN clinic to eval and treat.

## 2023-11-03 NOTE — PROGRESS NOTES
Alondra Plasencia is a 58 y.o. female here for a diabetic eye screening with non-dilated fundus photos per LIBIA Howell.    Patient cooperative?: Yes  Small pupils?: No  Last eye exam: unknown    For exam results, see Encounter Report.

## 2024-02-05 PROBLEM — Z00.00 WELL ADULT EXAM: Status: RESOLVED | Noted: 2022-06-27 | Resolved: 2024-02-05

## 2024-05-01 ENCOUNTER — APPOINTMENT (OUTPATIENT)
Dept: LAB | Facility: HOSPITAL | Age: 59
End: 2024-05-01
Payer: MEDICAID

## 2024-05-02 ENCOUNTER — OFFICE VISIT (OUTPATIENT)
Dept: INTERNAL MEDICINE | Facility: CLINIC | Age: 59
End: 2024-05-02
Payer: MEDICAID

## 2024-05-02 VITALS
HEART RATE: 80 BPM | BODY MASS INDEX: 27.7 KG/M2 | OXYGEN SATURATION: 98 % | DIASTOLIC BLOOD PRESSURE: 70 MMHG | WEIGHT: 166.25 LBS | SYSTOLIC BLOOD PRESSURE: 111 MMHG | RESPIRATION RATE: 20 BRPM | HEIGHT: 65 IN | TEMPERATURE: 98 F

## 2024-05-02 DIAGNOSIS — E78.2 MIXED HYPERLIPIDEMIA: Primary | ICD-10-CM

## 2024-05-02 DIAGNOSIS — Z12.31 BREAST CANCER SCREENING BY MAMMOGRAM: ICD-10-CM

## 2024-05-02 DIAGNOSIS — Z13.21 ENCOUNTER FOR VITAMIN DEFICIENCY SCREENING: ICD-10-CM

## 2024-05-02 DIAGNOSIS — Z13.29 SCREENING FOR THYROID DISORDER: ICD-10-CM

## 2024-05-02 DIAGNOSIS — E11.9 TYPE 2 DIABETES MELLITUS WITHOUT COMPLICATION, WITHOUT LONG-TERM CURRENT USE OF INSULIN: ICD-10-CM

## 2024-05-02 DIAGNOSIS — Z13.0 SCREENING FOR IRON DEFICIENCY ANEMIA: ICD-10-CM

## 2024-05-02 PROCEDURE — 3074F SYST BP LT 130 MM HG: CPT | Mod: CPTII,,,

## 2024-05-02 PROCEDURE — 99214 OFFICE O/P EST MOD 30 MIN: CPT | Mod: S$PBB,,,

## 2024-05-02 PROCEDURE — 1160F RVW MEDS BY RX/DR IN RCRD: CPT | Mod: CPTII,,,

## 2024-05-02 PROCEDURE — 3078F DIAST BP <80 MM HG: CPT | Mod: CPTII,,,

## 2024-05-02 PROCEDURE — 3044F HG A1C LEVEL LT 7.0%: CPT | Mod: CPTII,,,

## 2024-05-02 PROCEDURE — 3008F BODY MASS INDEX DOCD: CPT | Mod: CPTII,,,

## 2024-05-02 PROCEDURE — 99215 OFFICE O/P EST HI 40 MIN: CPT | Mod: PBBFAC

## 2024-05-02 PROCEDURE — 1159F MED LIST DOCD IN RCRD: CPT | Mod: CPTII,,,

## 2024-05-02 PROCEDURE — 3061F NEG MICROALBUMINURIA REV: CPT | Mod: CPTII,,,

## 2024-05-02 PROCEDURE — 3066F NEPHROPATHY DOC TX: CPT | Mod: CPTII,,,

## 2024-05-02 RX ORDER — METFORMIN HYDROCHLORIDE 500 MG/1
500 TABLET, EXTENDED RELEASE ORAL DAILY
Qty: 90 TABLET | Refills: 2 | Status: SHIPPED | OUTPATIENT
Start: 2024-05-02 | End: 2025-05-02

## 2024-05-02 NOTE — ASSESSMENT & PLAN NOTE
"Lab Results   Component Value Date    .00 (H) 05/01/2024       Lab Results   Component Value Date    TRIG 105 05/01/2024       Lab Results   Component Value Date    HDL 56 05/01/2024        Lab Results   Component Value Date    CHOL 229 (H) 05/01/2024      Declines statin therapy - will repeat in 6 months.  Patient states "give me 6 months."  Follow a low cholesterol, low saturated fat diet with less than 200 mg of cholesterol a day.   Avoid fried foods and high saturated fats.  Add flax seed or fish oil supplements to diet.   Increase dietary fiber.   Regular exercise improves cholesterol levels.  Physical activity 5 times a week for 30 minutes per day (or 150 minutes per week).   Stressed importance of dietary modifications.   "

## 2024-05-02 NOTE — PATIENT INSTRUCTIONS
REMINDER: Please complete labs within 1 week of appointment.   Please complete satisfaction survey when received. Thank you.    Bhaskar Cantu,     If you are due for any health screening(s) below please notify me so we can arrange them to be ordered and scheduled. Most healthy patients at your age complete them, but you are free to accept or refuse.     If you can't do it, I'll definitely understand. If you can, I'd certainly appreciate it!    Tests to Keep You Healthy    Mammogram: Met on 8/17/2023  Eye Exam: Met on 11/2/2023  Colon Cancer Screening: Met on 7/10/2022  Cervical Cancer Screening: DUE  Last HbA1c < 8 (05/01/2024): Yes      Your cervical cancer screening is due     Our records indicate that you may be overdue for your screening Pap smear. A Pap smear is an important health screening that can detect abnormal cells that can become cervical cancer. Cervical cancer screenings allow for early diagnosis and increase the likelihood of successful treatment.     The current recommendation for Pap smear screening is every 3-5 years for women at average risk. We encourage you to schedule your appointment with your womens health provider. Many women see a gynecologist for this screening, but some primary care providers also provide Pap screening.     If you recently had your Pap smear screening performed outside of [facility], please let your health care team know so that they can update your health record.

## 2024-05-02 NOTE — PROGRESS NOTES
PATIENT NAME: Alondra Plasencia  : 1965  DATE: 24  MRN: [id number]          Reason for Visit/Chief Complaint   Diabetes, Results, and Hyperlipidemia       History of Present Illness (HPI)     Alondra Plasencia is a 59 y.o. White female presenting in clinic today for Diabetes, Results, and Hyperlipidemia.  PMH of DM and GERD (controlled with diet).      All pertinent labs dated 2024 reviewed and discussed with patient.     [id number] - at goal. She is compliant metformin 500 mg daily. Does not regularly check blood sugar.      Denies smoking, drinking, or illicit drug use. Denies chest pain, shortness of breath, cough, headache, dizziness, weakness, abdominal pain, nausea, vomiting, diarrhea, constipation, dysuria, SI, and HI.    Breast Cancer Screening - 2023 - Birads 1. Repeat annually.  Cervical Cancer Screening - Cancelled appt on 3/14/2023, has not been rescheduled. Re-referred to GYN clinic (2023).  Osteoporosis Screening - Declines Dexa scan.  Colon Cancer Screening - 7/10/2022-cologuard negative  Vaccinations: Declines all vaccines.  Eye exam - Fundus - 2023.  Dental Exam - Several years. List of local dentists provided to patient.    Review of Systems     Review of Systems   Constitutional: Negative.    HENT: Negative.     Eyes: Negative.    Respiratory: Negative.     Cardiovascular: Negative.    Gastrointestinal: Negative.    Endocrine: Negative.    Genitourinary: Negative.    Musculoskeletal: Negative.    Skin: Negative.    Allergic/Immunologic: Negative.    Neurological: Negative.    Hematological: Negative.    Psychiatric/Behavioral: Negative.     All other systems reviewed and are negative.      Medical / Social / Family History     Past Medical History:   Diagnosis Date    Diabetes mellitus, type 2     GERD (gastroesophageal reflux disease)     Obesity, unspecified          Past Surgical History:   Procedure Laterality Date    CHOLECYSTECTOMY      FOOT SURGERY           Social  "History  Alondra Plasencia's  reports that she has never smoked. She has never used smokeless tobacco. She reports that she does not currently use alcohol. She reports that she does not use drugs.    Family History  Alondra Plasencia's family history includes Depression in her brother; Diabetes type II in her father; Heart disease in her mother; Multiple sclerosis in her father.    Medications and Allergies     Medications  Current Outpatient Medications   Medication Instructions    metFORMIN (GLUCOPHAGE-XR) 500 mg, Oral, Daily       Allergies  Review of patient's allergies indicates:  No Known Allergies    Physical Examination   Visit Vitals  /70 (BP Location: Right arm, Patient Position: Sitting, BP Method: Large (Automatic))   Pulse 80   Temp 98 °F (36.7 °C) (Oral)   Resp 20   Ht 5' 5" (1.651 m)   Wt 75.4 kg (166 lb 3.6 oz)   SpO2 98%   BMI 27.66 kg/m²     Physical Exam  Vitals reviewed.   Constitutional:       Appearance: Normal appearance. She is normal weight.   HENT:      Head: Normocephalic and atraumatic.      Right Ear: External ear normal.      Left Ear: External ear normal.      Nose: Nose normal.      Mouth/Throat:      Mouth: Mucous membranes are moist.      Pharynx: Oropharynx is clear.   Eyes:      Extraocular Movements: Extraocular movements intact.      Conjunctiva/sclera: Conjunctivae normal.      Pupils: Pupils are equal, round, and reactive to light.   Cardiovascular:      Rate and Rhythm: Normal rate and regular rhythm.      Pulses: Normal pulses.      Heart sounds: Normal heart sounds.   Pulmonary:      Effort: Pulmonary effort is normal.      Breath sounds: Normal breath sounds.   Abdominal:      General: Bowel sounds are normal.      Palpations: Abdomen is soft.   Musculoskeletal:         General: Normal range of motion.      Cervical back: Normal range of motion and neck supple.   Skin:     General: Skin is warm and dry.      Capillary Refill: Capillary refill takes less than 2 seconds. "   Neurological:      General: No focal deficit present.      Mental Status: She is alert and oriented to person, place, and time.   Psychiatric:         Mood and Affect: Mood normal.         Behavior: Behavior normal.         Thought Content: Thought content normal.         Judgment: Judgment normal.           Results     Lab Results   Component Value Date    WBC 7.00 05/01/2024    RBC 4.67 05/01/2024    HGB 13.3 05/01/2024    HCT 41.0 05/01/2024    MCV 87.8 05/01/2024    MCH 28.5 05/01/2024    MCHC 32.4 (L) 05/01/2024    RDW 12.8 05/01/2024     05/01/2024    MPV 11.2 (H) 05/01/2024      Lab Results   Component Value Date     05/01/2024    K 4.2 05/01/2024    CHLORIDE 109 (H) 05/01/2024    CO2 25 05/01/2024    GLUCOSE 157 (H) 05/01/2024    BUN 15.2 05/01/2024    CREATININE 0.83 05/01/2024    LABPROT 7.4 05/01/2024    ALBUMIN 4.0 05/01/2024    BILITOT 0.3 05/01/2024    ALKPHOS 97 05/01/2024    AST 18 05/01/2024    ALT 19 05/01/2024    EGFRNORACEVR >60 05/01/2024     Lab Results   Component Value Date    TSH 3.828 11/01/2023     Lab Results   Component Value Date    CHOL 229 (H) 05/01/2024    HDL 56 05/01/2024    .00 (H) 05/01/2024    TRIG 105 05/01/2024     Lab Results   Component Value Date    COLORUA Light-Yellow 05/01/2024    SGUA 1.018 05/01/2024    PROTEINUA Negative 05/01/2024    GLUCOSEUA Normal 05/01/2024    BILIRUBINUA Negative 05/01/2024    BLOODUA 1+ (A) 05/01/2024    WBCUA 0-5 05/01/2024    RBCUA 0-5 05/01/2024    BACTERIA None Seen 05/01/2024    NITRITESUA Negative 05/01/2024    LEUKOCYTESUR Negative 05/01/2024    UROBILINOGEN Normal 05/01/2024     Lab Results   Component Value Date    CREATRANDUR 101.7 05/01/2024    MICALBCREAT 7.2 05/01/2024     Lab Results   Component Value Date    IUPPFJJE74GF 43.0 11/01/2023    FOLATE 17.4 04/28/2023     Lab Results   Component Value Date    HIV Nonreactive 06/23/2022    HEPAIGM Nonreactive 06/23/2022    HEPBCOREM Nonreactive 06/23/2022    HEPCAB  "Nonreactive 06/23/2022     Lab Results   Component Value Date    COLOGUARD Negative 07/10/2022     No results found for: "OCCBLDIA"    Assessment and Plan (including Health Maintenance)     Problem List Items Addressed This Visit          Cardiac/Vascular    Mixed hyperlipidemia - Primary    Current Assessment & Plan     Lab Results   Component Value Date    .00 (H) 05/01/2024       Lab Results   Component Value Date    TRIG 105 05/01/2024       Lab Results   Component Value Date    HDL 56 05/01/2024        Lab Results   Component Value Date    CHOL 229 (H) 05/01/2024      Declines statin therapy - will repeat in 6 months.  Patient states "give me 6 months."  Follow a low cholesterol, low saturated fat diet with less than 200 mg of cholesterol a day.   Avoid fried foods and high saturated fats.  Add flax seed or fish oil supplements to diet.   Increase dietary fiber.   Regular exercise improves cholesterol levels.  Physical activity 5 times a week for 30 minutes per day (or 150 minutes per week).   Stressed importance of dietary modifications.          Relevant Orders    Lipid Panel       Renal/    Breast cancer screening by mammogram    Current Assessment & Plan     MMG ordered - due 8/18/2024.         Relevant Orders    Mammo Digital Screening Bilat w/ Lado       Endocrine    Type 2 diabetes mellitus without complication, without long-term current use of insulin    Current Assessment & Plan     Lab Results   Component Value Date    HGBA1C 6.6 05/01/2024    HGBA1C 6.4 11/01/2023    HGBA1C 6.6 04/28/2023    HGBA1C 6.1 10/25/2022     Continue metformin as prescribed.   Follow ADA diet.   Check blood glucose twice daily and as needed. Bring log to every office visit.  Avoid soda, simple sweets, and limit rice/pasta/bread/starches and consume brown options when possible.    Maintain healthy weight with BMI goal <30.    Perform aerobic exercise for 150 minutes per week (or 5 days a week for 30 minutes each " day).    Examine feet daily.    Obtain annual dilated eye exam.   Kidney Protection: Declines.  Statin Therapy: Declines.  Eye exam: Fundus - 11/2/2023.  Foot exam:  11/2/2023.         Relevant Medications    metFORMIN (GLUCOPHAGE-XR) 500 MG ER 24hr tablet    Other Relevant Orders    Urinalysis, Reflex to Urine Culture    Microalbumin/Creatinine Ratio, Urine    Hemoglobin A1C    Comprehensive Metabolic Panel    CBC Auto Differential    BMI 27.0-27.9,adult    Current Assessment & Plan     Body mass index is 27.66 kg/m². (At goal).           Other Visit Diagnoses       Encounter for vitamin deficiency screening        Relevant Orders    Vitamin D    Screening for thyroid disorder        Relevant Orders    TSH    T4, Free    Screening for iron deficiency anemia        Relevant Orders    Iron and TIBC    Ferritin             Health Maintenance Due   Topic Date Due    Cervical Cancer Screening  Never done    RSV Vaccine (Age 60+ and Pregnant patients) (1 - 1-dose 60+ series) Never done    COVID-19 Vaccine (1 - 2023-24 season) Never done     Tests to Keep You Healthy    Mammogram: Met on 8/17/2023  Eye Exam: Met on 11/2/2023  Colon Cancer Screening: Met on 7/10/2022  Cervical Cancer Screening: DUE  Last HbA1c < 8 (05/01/2024): Yes      Health Maintenance Topics with due status: Not Due       Topic Last Completion Date    Colorectal Cancer Screening 07/10/2022    Mammogram 08/17/2023    Foot Exam 11/02/2023    Eye Exam 11/02/2023    Diabetes Urine Screening 05/01/2024    Lipid Panel 05/01/2024    Hemoglobin A1c 05/01/2024    Influenza Vaccine Not Due       Future Appointments   Date Time Provider Department Center   11/4/2024  8:40 AM Winnie Berg FNP [facility]   9/24/2025  1:00 PM Lori Camarillo FNP [facility]        Follow up in about 6 months (around 11/2/2024) for F2F, Follow up, Med check, Lab review, Wellness, RTC PRN.          Signature:        Shanna C Burrell, FNP [facility]  [address] [facility] [id number]    Date of encounter: 5/2/24

## 2024-05-02 NOTE — ASSESSMENT & PLAN NOTE
Lab Results   Component Value Date    HGBA1C 6.6 05/01/2024    HGBA1C 6.4 11/01/2023    HGBA1C 6.6 04/28/2023    HGBA1C 6.1 10/25/2022     Continue metformin as prescribed.   Follow ADA diet.   Check blood glucose twice daily and as needed. Bring log to every office visit.  Avoid soda, simple sweets, and limit rice/pasta/bread/starches and consume brown options when possible.    Maintain healthy weight with BMI goal <30.    Perform aerobic exercise for 150 minutes per week (or 5 days a week for 30 minutes each day).    Examine feet daily.    Obtain annual dilated eye exam.   Kidney Protection: Declines.  Statin Therapy: Declines.  Eye exam: Fundus - 11/2/2023.  Foot exam:  11/2/2023.

## 2024-09-17 ENCOUNTER — TELEPHONE (OUTPATIENT)
Dept: GYNECOLOGY | Facility: CLINIC | Age: 59
End: 2024-09-17
Payer: MEDICAID

## 2024-09-20 ENCOUNTER — OFFICE VISIT (OUTPATIENT)
Dept: GYNECOLOGY | Facility: CLINIC | Age: 59
End: 2024-09-20
Payer: MEDICAID

## 2024-09-20 VITALS
OXYGEN SATURATION: 100 % | WEIGHT: 167.19 LBS | DIASTOLIC BLOOD PRESSURE: 78 MMHG | RESPIRATION RATE: 18 BRPM | BODY MASS INDEX: 27.86 KG/M2 | HEIGHT: 65 IN | SYSTOLIC BLOOD PRESSURE: 132 MMHG | TEMPERATURE: 99 F | HEART RATE: 78 BPM

## 2024-09-20 DIAGNOSIS — Z12.4 ENCOUNTER FOR PAPANICOLAOU SMEAR FOR CERVICAL CANCER SCREENING: Primary | ICD-10-CM

## 2024-09-20 PROCEDURE — 99213 OFFICE O/P EST LOW 20 MIN: CPT | Mod: PBBFAC

## 2024-09-20 NOTE — PROGRESS NOTES
"  [facility] -  Gynecology / Women's Health Clinic     Subjective:      Patient ID: Alondra Plasencia is a 59 y.o. female.    Chief Complaint:  Gynecologic Exam      History of Present Illness:  The patient  here for annual exam. Last GYN exam 5 yrs. Pt Postmenopausal since early 50s. Denies history of abnormal paps. MG- 23 & BIRADS 1. Denies breast or urinary complaints. Denies pelvic pain, abnormal bleeding or discharge. Denies hot flashes. Not sexually active. Pt reports no STIs in the past and no concerns. Denies tobacco use. Dep. screening 0. Denies fly hx of ovarian, uterine or colon cancer. Paternal aunt/Maternal aunt with breast cancer. Cologuard  neg.    GYN & OB History:  No LMP recorded. Patient is postmenopausal.     OB History    Para Term  AB Living   1 1   1       SAB IAB Ectopic Multiple Live Births                  # Outcome Date GA Lbr Marco/2nd Weight Sex Type Anes PTL Lv   1       Vag-Spont          Past Medical History:   Diagnosis Date    Diabetes mellitus, type 2     GERD (gastroesophageal reflux disease)     Obesity, unspecified         Past Surgical History:   Procedure Laterality Date    CHOLECYSTECTOMY      FOOT SURGERY          Social History     Tobacco Use    Smoking status: Never    Smokeless tobacco: Never   Substance and Sexual Activity    Alcohol use: Not Currently     Comment: occasionally    Drug use: Never    Sexual activity: Not Currently     Partners: Male        Current Outpatient Medications   Medication Instructions    metFORMIN (GLUCOPHAGE-XR) 500 mg, Oral, Daily       Review of patient's allergies indicates:  No Known Allergies      Review of Systems:  Review of Systems  Negative except for pertinent findings for positives per HPI.     Objective:     Physical Exam   Visit Vitals  /78   Pulse 78   Temp 98.7 °F (37.1 °C) (Oral)   Resp 18   Ht 5' 5" (1.651 m)   Wt 75.8 kg (167 lb 3.2 oz)   SpO2 100%   BMI 27.82 kg/m² "       GENERAL: Well-developed female. No acute distress.    SKIN: Normal to inspection, warm and intact.  BREASTS: No rashes or erythema. No masses, lumps, discharge, tenderness.  VULVA: General appearance normal; external genitalia with no lesions or erythema.  VAGINA: Mucosa/vaginal vault atrophic, no abnormal discharge or lesions.  CERVIX: Atrophic, parous appearing os, no erythema or abnormal discharge.  BIMANUAL EXAM: reveals a 8 week-sized uterus. The uterus is non tender. Bilateral adnexa reveal no tenderness.  PSYCHIATRIC: Patient is oriented to person, place, and time. Mood and affect are normal.    Assessment:       ICD-10-CM ICD-9-CM   1. Encounter for Papanicolaou smear for cervical cancer screening  Z12.4 V76.2       Plan:     1. Encounter for Papanicolaou smear for cervical cancer screening  -     Liquid-Based Pap Smear, Screening    Pap today  MG ordered per PCP, encouraged patient to complete, scheduling number given to patient    Call with any vaginal bleeding which would be abnormal    Follow up in about 1 year (around 9/20/2025) for Annual.

## 2024-10-31 ENCOUNTER — LAB VISIT (OUTPATIENT)
Dept: LAB | Facility: HOSPITAL | Age: 59
End: 2024-10-31
Payer: MEDICAID

## 2024-10-31 DIAGNOSIS — E78.2 MIXED HYPERLIPIDEMIA: ICD-10-CM

## 2024-10-31 DIAGNOSIS — Z13.0 SCREENING FOR IRON DEFICIENCY ANEMIA: ICD-10-CM

## 2024-10-31 DIAGNOSIS — Z13.29 SCREENING FOR THYROID DISORDER: ICD-10-CM

## 2024-10-31 DIAGNOSIS — Z13.21 ENCOUNTER FOR VITAMIN DEFICIENCY SCREENING: ICD-10-CM

## 2024-10-31 DIAGNOSIS — E11.9 TYPE 2 DIABETES MELLITUS WITHOUT COMPLICATION, WITHOUT LONG-TERM CURRENT USE OF INSULIN: ICD-10-CM

## 2024-10-31 LAB
25(OH)D3+25(OH)D2 SERPL-MCNC: 43 NG/ML (ref 30–80)
ALBUMIN SERPL-MCNC: 4.1 G/DL (ref 3.4–4.8)
ALBUMIN/GLOB SERPL: 1.1 RATIO (ref 1.1–2)
ALP SERPL-CCNC: 102 UNIT/L (ref 40–150)
ALT SERPL-CCNC: 17 UNIT/L (ref 0–55)
ANION GAP SERPL CALC-SCNC: 8 MEQ/L
AST SERPL-CCNC: 18 UNIT/L (ref 5–34)
BACTERIA #/AREA URNS AUTO: ABNORMAL /HPF
BASOPHILS # BLD AUTO: 0.04 X10(3)/MCL
BASOPHILS NFR BLD AUTO: 0.5 %
BILIRUB SERPL-MCNC: 0.7 MG/DL
BILIRUB UR QL STRIP.AUTO: NEGATIVE
BUN SERPL-MCNC: 14.1 MG/DL (ref 9.8–20.1)
CALCIUM SERPL-MCNC: 10.3 MG/DL (ref 8.4–10.2)
CHLORIDE SERPL-SCNC: 106 MMOL/L (ref 98–107)
CHOLEST SERPL-MCNC: 189 MG/DL
CHOLEST/HDLC SERPL: 4 {RATIO} (ref 0–5)
CLARITY UR: CLEAR
CO2 SERPL-SCNC: 26 MMOL/L (ref 23–31)
COLOR UR AUTO: ABNORMAL
CREAT SERPL-MCNC: 0.87 MG/DL (ref 0.55–1.02)
CREAT UR-MCNC: 94.7 MG/DL (ref 45–106)
CREAT/UREA NIT SERPL: 16
EOSINOPHIL # BLD AUTO: 0.18 X10(3)/MCL (ref 0–0.9)
EOSINOPHIL NFR BLD AUTO: 2.4 %
ERYTHROCYTE [DISTWIDTH] IN BLOOD BY AUTOMATED COUNT: 12.6 % (ref 11.5–17)
EST. AVERAGE GLUCOSE BLD GHB EST-MCNC: 148.5 MG/DL
FERRITIN SERPL-MCNC: 133.14 NG/ML (ref 4.63–204)
GFR SERPLBLD CREATININE-BSD FMLA CKD-EPI: >60 ML/MIN/1.73/M2
GLOBULIN SER-MCNC: 3.6 GM/DL (ref 2.4–3.5)
GLUCOSE SERPL-MCNC: 133 MG/DL (ref 82–115)
GLUCOSE UR QL STRIP: NORMAL
HBA1C MFR BLD: 6.8 %
HCT VFR BLD AUTO: 41.9 % (ref 37–47)
HDLC SERPL-MCNC: 48 MG/DL (ref 35–60)
HGB BLD-MCNC: 13.7 G/DL (ref 12–16)
HGB UR QL STRIP: ABNORMAL
HYALINE CASTS #/AREA URNS LPF: ABNORMAL /LPF
IMM GRANULOCYTES # BLD AUTO: 0.01 X10(3)/MCL (ref 0–0.04)
IMM GRANULOCYTES NFR BLD AUTO: 0.1 %
IRON SATN MFR SERPL: 35 % (ref 20–50)
IRON SERPL-MCNC: 110 UG/DL (ref 50–170)
KETONES UR QL STRIP: ABNORMAL
LDLC SERPL CALC-MCNC: 124 MG/DL (ref 50–140)
LEUKOCYTE ESTERASE UR QL STRIP: 25
LYMPHOCYTES # BLD AUTO: 3.17 X10(3)/MCL (ref 0.6–4.6)
LYMPHOCYTES NFR BLD AUTO: 42.6 %
MCH RBC QN AUTO: 28.1 PG (ref 27–31)
MCHC RBC AUTO-ENTMCNC: 32.7 G/DL (ref 33–36)
MCV RBC AUTO: 86 FL (ref 80–94)
MICROALBUMIN UR-MCNC: 6.3 UG/ML
MICROALBUMIN/CREAT RATIO PNL UR: 6.7 MG/GM CR (ref 0–30)
MONOCYTES # BLD AUTO: 0.51 X10(3)/MCL (ref 0.1–1.3)
MONOCYTES NFR BLD AUTO: 6.9 %
MUCOUS THREADS URNS QL MICRO: ABNORMAL /LPF
NEUTROPHILS # BLD AUTO: 3.53 X10(3)/MCL (ref 2.1–9.2)
NEUTROPHILS NFR BLD AUTO: 47.5 %
NITRITE UR QL STRIP: NEGATIVE
NRBC BLD AUTO-RTO: 0 %
PH UR STRIP: 5 [PH]
PLATELET # BLD AUTO: 239 X10(3)/MCL (ref 130–400)
PMV BLD AUTO: 12 FL (ref 7.4–10.4)
POTASSIUM SERPL-SCNC: 4.7 MMOL/L (ref 3.5–5.1)
PROT SERPL-MCNC: 7.7 GM/DL (ref 5.8–7.6)
PROT UR QL STRIP: NEGATIVE
RBC # BLD AUTO: 4.87 X10(6)/MCL (ref 4.2–5.4)
RBC #/AREA URNS AUTO: ABNORMAL /HPF
SODIUM SERPL-SCNC: 140 MMOL/L (ref 136–145)
SP GR UR STRIP.AUTO: 1.02 (ref 1–1.03)
SQUAMOUS #/AREA URNS LPF: ABNORMAL /HPF
T4 FREE SERPL-MCNC: 1 NG/DL (ref 0.7–1.48)
TIBC SERPL-MCNC: 200 UG/DL (ref 70–310)
TIBC SERPL-MCNC: 310 UG/DL (ref 250–450)
TRANSFERRIN SERPL-MCNC: 269 MG/DL (ref 173–360)
TRIGL SERPL-MCNC: 86 MG/DL (ref 37–140)
TSH SERPL-ACNC: 2.64 UIU/ML (ref 0.35–4.94)
UROBILINOGEN UR STRIP-ACNC: NORMAL
VLDLC SERPL CALC-MCNC: 17 MG/DL
WBC # BLD AUTO: 7.44 X10(3)/MCL (ref 4.5–11.5)
WBC #/AREA URNS AUTO: ABNORMAL /HPF

## 2024-10-31 PROCEDURE — 81001 URINALYSIS AUTO W/SCOPE: CPT

## 2024-10-31 PROCEDURE — 84443 ASSAY THYROID STIM HORMONE: CPT

## 2024-10-31 PROCEDURE — 83550 IRON BINDING TEST: CPT

## 2024-10-31 PROCEDURE — 80061 LIPID PANEL: CPT

## 2024-10-31 PROCEDURE — 82570 ASSAY OF URINE CREATININE: CPT

## 2024-10-31 PROCEDURE — 85025 COMPLETE CBC W/AUTO DIFF WBC: CPT

## 2024-10-31 PROCEDURE — 83036 HEMOGLOBIN GLYCOSYLATED A1C: CPT

## 2024-10-31 PROCEDURE — 82306 VITAMIN D 25 HYDROXY: CPT

## 2024-10-31 PROCEDURE — 80053 COMPREHEN METABOLIC PANEL: CPT

## 2024-10-31 PROCEDURE — 84439 ASSAY OF FREE THYROXINE: CPT

## 2024-10-31 PROCEDURE — 36415 COLL VENOUS BLD VENIPUNCTURE: CPT

## 2024-10-31 PROCEDURE — 82728 ASSAY OF FERRITIN: CPT

## 2024-11-05 ENCOUNTER — CLINICAL SUPPORT (OUTPATIENT)
Dept: INTERNAL MEDICINE | Facility: CLINIC | Age: 59
End: 2024-11-05
Payer: MEDICAID

## 2024-11-05 ENCOUNTER — OFFICE VISIT (OUTPATIENT)
Dept: INTERNAL MEDICINE | Facility: CLINIC | Age: 59
End: 2024-11-05
Payer: MEDICAID

## 2024-11-05 VITALS
TEMPERATURE: 98 F | BODY MASS INDEX: 27.47 KG/M2 | DIASTOLIC BLOOD PRESSURE: 67 MMHG | WEIGHT: 164.88 LBS | RESPIRATION RATE: 18 BRPM | HEIGHT: 65 IN | HEART RATE: 78 BPM | SYSTOLIC BLOOD PRESSURE: 107 MMHG | OXYGEN SATURATION: 98 %

## 2024-11-05 DIAGNOSIS — Z13.5 SCREENING FOR DIABETIC RETINOPATHY: ICD-10-CM

## 2024-11-05 DIAGNOSIS — E78.2 MIXED HYPERLIPIDEMIA: ICD-10-CM

## 2024-11-05 DIAGNOSIS — Z00.00 WELL ADULT EXAM: Primary | ICD-10-CM

## 2024-11-05 DIAGNOSIS — Z53.20 STATIN DECLINED: ICD-10-CM

## 2024-11-05 DIAGNOSIS — E11.9 TYPE 2 DIABETES MELLITUS WITHOUT COMPLICATION, WITHOUT LONG-TERM CURRENT USE OF INSULIN: ICD-10-CM

## 2024-11-05 PROCEDURE — 3066F NEPHROPATHY DOC TX: CPT | Mod: CPTII,,,

## 2024-11-05 PROCEDURE — 92228 IMG RTA DETC/MNTR DS PHY/QHP: CPT | Mod: PBBFAC

## 2024-11-05 PROCEDURE — 3008F BODY MASS INDEX DOCD: CPT | Mod: CPTII,,,

## 2024-11-05 PROCEDURE — 1160F RVW MEDS BY RX/DR IN RCRD: CPT | Mod: CPTII,,,

## 2024-11-05 PROCEDURE — 99396 PREV VISIT EST AGE 40-64: CPT | Mod: S$PBB,,,

## 2024-11-05 PROCEDURE — 92228 IMG RTA DETC/MNTR DS PHY/QHP: CPT | Mod: TC,PBBFAC | Performed by: FAMILY MEDICINE

## 2024-11-05 PROCEDURE — 3078F DIAST BP <80 MM HG: CPT | Mod: CPTII,,,

## 2024-11-05 PROCEDURE — 1159F MED LIST DOCD IN RCRD: CPT | Mod: CPTII,,,

## 2024-11-05 PROCEDURE — 3044F HG A1C LEVEL LT 7.0%: CPT | Mod: CPTII,,,

## 2024-11-05 PROCEDURE — 3074F SYST BP LT 130 MM HG: CPT | Mod: CPTII,,,

## 2024-11-05 PROCEDURE — 99214 OFFICE O/P EST MOD 30 MIN: CPT | Mod: PBBFAC

## 2024-11-05 PROCEDURE — 99214 OFFICE O/P EST MOD 30 MIN: CPT | Mod: S$PBB,25,,

## 2024-11-05 PROCEDURE — 3061F NEG MICROALBUMINURIA REV: CPT | Mod: CPTII,,,

## 2024-11-05 RX ORDER — INSULIN PUMP SYRINGE, 3 ML
EACH MISCELLANEOUS
Qty: 1 EACH | Refills: 0 | Status: SHIPPED | OUTPATIENT
Start: 2024-11-05 | End: 2025-11-05

## 2024-11-05 RX ORDER — LANCETS
EACH MISCELLANEOUS
Qty: 100 EACH | Refills: 3 | Status: SHIPPED | OUTPATIENT
Start: 2024-11-05

## 2024-11-05 RX ORDER — METFORMIN HYDROCHLORIDE 500 MG/1
500 TABLET, EXTENDED RELEASE ORAL DAILY
Qty: 30 TABLET | Refills: 8 | Status: SHIPPED | OUTPATIENT
Start: 2024-11-05 | End: 2025-11-05

## 2024-11-05 NOTE — PROGRESS NOTES
Alondra Plasencia is a 59 y.o. female here for a diabetic eye screening with non-dilated fundus photos per LIBIA Santos    Patient cooperative?: Yes  Small pupils?: Yes  Last eye exam: Unknown    For exam results, see Encounter Report.

## 2024-11-05 NOTE — ASSESSMENT & PLAN NOTE
"Lab Results   Component Value Date    .00 10/31/2024       Lab Results   Component Value Date    TRIG 86 10/31/2024       Lab Results   Component Value Date    HDL 48 10/31/2024        Lab Results   Component Value Date    CHOL 189 10/31/2024      Declines statin therapy - will repeat in 6 months.  Patient states "I will try changing my diet some more"  Follow a low cholesterol, low saturated fat diet with less than 200 mg of cholesterol a day.   Avoid fried foods and high saturated fats.  Add flax seed or fish oil supplements to diet.   Increase dietary fiber.   Regular exercise improves cholesterol levels.  Physical activity 5 times a week for 30 minutes per day (or 150 minutes per week).   Stressed importance of dietary modifications.   "

## 2024-11-05 NOTE — PROGRESS NOTES
"    PATIENT NAME: Alondra Plasencia  : 1965  DATE: 24  MRN: [id number]          Reason for Visit/Chief Complaint   Annual Exam, Diabetes, Results, and Diabetic Eye Photo       History of Present Illness (HPI)     Alondra Plasencia is a 59 y.o. White female presenting in clinic today for an Annual Exam, Diabetes, Results, and Diabetic Eye Photo. PMH of DM and GERD (controlled with diet).      All pertinent labs dated 10/31/2024 reviewed and discussed with patient. Patient has no complaints today. Patients LDL was 124. She was educated on the importance of having a LDL <100 due to her diabetes. She continues to decline starting a statin. "I have been walking about 3 days a week and eating healthier. I will continue trying to lower it by changing some of the things I still eat."    TmqK2l-3.8 - at goal. She is compliant metformin 500 mg daily.  Patient endorses checking her blood sugar occasionally, which ranges around 120 in the morning.       Denies smoking, drinking, or illicit drug use. Denies chest pain, shortness of breath, cough, headache, dizziness, weakness, abdominal pain, nausea, vomiting, diarrhea, constipation, dysuria, SI, and HI.    Breast Cancer Screening - 2023 - Birads 1. Repeat annually.Mammogram ordered on 2024. Central scheduling number provided to patient to schedule mammogram.  Cervical Cancer Screening - 2024- negative.  Osteoporosis Screening - Declines Dexa scan.  Colon Cancer Screening - 7/10/2022-cologuard negative. Repeat in 3 years.   Vaccinations: Declines all vaccines.  Eye exam - Fundus - 2024.  Dental Exam - Several years. List of local dentists provided to patient.    Review of Systems     Review of Systems   Constitutional: Negative.    HENT: Negative.     Eyes: Negative.    Respiratory: Negative.     Cardiovascular: Negative.    Gastrointestinal: Negative.    Endocrine: Negative.    Genitourinary: Negative.    Musculoskeletal: Negative.    Skin: Negative.  " "  Allergic/Immunologic: Negative.    Neurological: Negative.    Hematological: Negative.    Psychiatric/Behavioral: Negative.     All other systems reviewed and are negative.      Medical / Social / Family History     Past Medical History:   Diagnosis Date    Diabetes mellitus, type 2     GERD (gastroesophageal reflux disease)     Obesity, unspecified          Past Surgical History:   Procedure Laterality Date    CHOLECYSTECTOMY      FOOT SURGERY           Social History  Alondra Gerards  reports that she has never smoked. She has never used smokeless tobacco. She reports that she does not currently use alcohol. She reports that she does not use drugs.    Family History  Alondra Plasencia's family history includes Breast cancer in an other family member; Depression in her brother; Diabetes type II in her father; Heart disease in her mother; Multiple sclerosis in her father.    Medications and Allergies     Medications  Current Outpatient Medications   Medication Instructions    blood sugar diagnostic Strp To check BG 2 times daily, to use with insurance preferred meter    blood-glucose meter kit To check BG  2 times daily, to use with insurance preferred meter    lancets Misc To check BG 2 times daily, to use with insurance preferred meter    metFORMIN (GLUCOPHAGE-XR) 500 mg, Oral, Daily       Allergies  Review of patient's allergies indicates:  No Known Allergies    Physical Examination   Visit Vitals  /67 (BP Location: Left arm, Patient Position: Sitting)   Pulse 78   Temp 98.2 °F (36.8 °C) (Oral)   Resp 18   Ht 5' 5" (1.651 m)   Wt 74.8 kg (164 lb 14.4 oz)   SpO2 98%   BMI 27.44 kg/m²     Physical Exam  Vitals reviewed.   Constitutional:       Appearance: Normal appearance. She is normal weight.   HENT:      Head: Normocephalic and atraumatic.      Right Ear: External ear normal.      Left Ear: External ear normal.      Nose: Nose normal.      Mouth/Throat:      Mouth: Mucous membranes are moist.      " Pharynx: Oropharynx is clear.   Eyes:      Extraocular Movements: Extraocular movements intact.      Conjunctiva/sclera: Conjunctivae normal.      Pupils: Pupils are equal, round, and reactive to light.   Cardiovascular:      Rate and Rhythm: Normal rate and regular rhythm.      Pulses: Normal pulses.           Dorsalis pedis pulses are 2+ on the right side and 2+ on the left side.        Posterior tibial pulses are 2+ on the right side and 2+ on the left side.      Heart sounds: Normal heart sounds.   Pulmonary:      Effort: Pulmonary effort is normal.      Breath sounds: Normal breath sounds.   Abdominal:      General: Bowel sounds are normal.      Palpations: Abdomen is soft.   Musculoskeletal:         General: Normal range of motion.      Cervical back: Normal range of motion and neck supple.   Feet:      Right foot:      Protective Sensation: 6 sites tested.  6 sites sensed.      Skin integrity: Skin integrity normal.      Toenail Condition: Right toenails are normal.      Left foot:      Protective Sensation: 6 sites tested.  6 sites sensed.      Skin integrity: Skin integrity normal.      Toenail Condition: Left toenails are normal.      Comments: Protective Sensation (w/ 10 gram monofilament):  Right: Intact  Left: Intact    Visual Inspection:  Normal -  Bilateral and Nails Intact - without Evidence of Foot Deformity- Bilateral    Pedal Pulses:   Right: Present  Left: Present    Posterior Tibialis Pulses:   Right:Present  Left: Present       Skin:     General: Skin is warm and dry.      Capillary Refill: Capillary refill takes less than 2 seconds.   Neurological:      General: No focal deficit present.      Mental Status: She is alert and oriented to person, place, and time.   Psychiatric:         Mood and Affect: Mood normal.         Behavior: Behavior normal.         Thought Content: Thought content normal.         Judgment: Judgment normal.           Results     Lab Results   Component Value Date    WBC  "7.44 10/31/2024    RBC 4.87 10/31/2024    HGB 13.7 10/31/2024    HCT 41.9 10/31/2024    MCV 86.0 10/31/2024    MCH 28.1 10/31/2024    MCHC 32.7 (L) 10/31/2024    RDW 12.6 10/31/2024     10/31/2024    MPV 12.0 (H) 10/31/2024      Lab Results   Component Value Date     10/31/2024    K 4.7 10/31/2024    CO2 26 10/31/2024    GLUCOSE 133 (H) 10/31/2024    BUN 14.1 10/31/2024    CREATININE 0.87 10/31/2024    LABPROT 7.7 (H) 10/31/2024    ALBUMIN 4.1 10/31/2024    BILITOT 0.7 10/31/2024    ALKPHOS 102 10/31/2024    AST 18 10/31/2024    ALT 17 10/31/2024    AGAP 8.0 10/31/2024    EGFRNORACEVR >60 10/31/2024     Lab Results   Component Value Date    TSH 2.644 10/31/2024     Lab Results   Component Value Date    CHOL 189 10/31/2024    HDL 48 10/31/2024    .00 10/31/2024    TRIG 86 10/31/2024     Lab Results   Component Value Date    SGUA 1.020 10/31/2024    PROTEINUA Negative 10/31/2024    BILIRUBINUA Negative 10/31/2024    WBCUA 0-5 10/31/2024    RBCUA 0-5 10/31/2024    BACTERIA None Seen 10/31/2024    LEUKOCYTESUR 25 (A) 10/31/2024    UROBILINOGEN Normal 10/31/2024     Lab Results   Component Value Date    CREATRANDUR 94.7 10/31/2024    MICALBCREAT 6.7 10/31/2024     Lab Results   Component Value Date    FNAGUVAN99VG 43 10/31/2024    FOLATE 17.4 04/28/2023     Lab Results   Component Value Date    HIV Nonreactive 06/23/2022    HEPAIGM Nonreactive 06/23/2022    HEPBSAG Nonreactive 06/23/2022    HEPCAB Nonreactive 06/23/2022     Lab Results   Component Value Date    COLOGUARD Negative 07/10/2022     No results found for: "OCCBLDIA"    Assessment and Plan (including Health Maintenance)     Problem List Items Addressed This Visit          Ophtho    Screening for diabetic retinopathy - Primary    Current Assessment & Plan     Fundus ordered, to be completed after visit.          Relevant Orders    Diabetic Eye Screening Photo (Completed)       Cardiac/Vascular    Mixed hyperlipidemia    Current Assessment & " "Plan     Lab Results   Component Value Date    .00 10/31/2024       Lab Results   Component Value Date    TRIG 86 10/31/2024       Lab Results   Component Value Date    HDL 48 10/31/2024        Lab Results   Component Value Date    CHOL 189 10/31/2024      Declines statin therapy - will repeat in 6 months.  Patient states "I will try changing my diet some more"  Follow a low cholesterol, low saturated fat diet with less than 200 mg of cholesterol a day.   Avoid fried foods and high saturated fats.  Add flax seed or fish oil supplements to diet.   Increase dietary fiber.   Regular exercise improves cholesterol levels.  Physical activity 5 times a week for 30 minutes per day (or 150 minutes per week).   Stressed importance of dietary modifications.          Relevant Orders    Comprehensive Metabolic Panel    Microalbumin/Creatinine Ratio, Urine    Lipid Panel    Statin declined    Current Assessment & Plan     , not at goal for diabetes. Patient continues to decline statin.             Endocrine    Type 2 diabetes mellitus without complication, without long-term current use of insulin    Current Assessment & Plan     Lab Results   Component Value Date    HGBA1C 6.8 10/31/2024    HGBA1C 6.6 05/01/2024    HGBA1C 6.4 11/01/2023    HGBA1C 6.6 04/28/2023     Continue metformin as prescribed- refilled   Follow ADA diet.   Check blood glucose twice daily and as needed. Bring log to every office visit.  Avoid soda, simple sweets, and limit rice/pasta/bread/starches and consume brown options when possible.    Maintain healthy weight with BMI goal <30.    Perform aerobic exercise for 150 minutes per week (or 5 days a week for 30 minutes each day).    Examine feet daily.    Obtain annual dilated eye exam.   Kidney Protection: Declines.  Statin Therapy: Declines.  Eye exam: Fundus - 11/5/2024.  Foot exam:  11/5/2024.         Relevant Medications    metFORMIN (GLUCOPHAGE-XR) 500 MG ER 24hr tablet    blood-glucose " meter kit    lancets Misc    blood sugar diagnostic Strp    Other Relevant Orders    Comprehensive Metabolic Panel    Microalbumin/Creatinine Ratio, Urine    Urinalysis, Reflex to Urine Culture    Hemoglobin A1C    BMI 27.0-27.9,adult    Current Assessment & Plan     Body mass index is 27.44 kg/m². (At goal).              Health Maintenance Due   Topic Date Due    Low Dose Statin  Never done    Mammogram  08/17/2024    COVID-19 Vaccine (1 - 2024-25 season) Never done    Foot Exam  11/02/2024     Tests to Keep You Healthy    Mammogram: ORDERED BUT NOT SCHEDULED  Eye Exam: Met on 11/5/2024  Colon Cancer Screening: Met on 7/10/2022  Cervical Cancer Screening: Met on 9/20/2024  Last HbA1c < 8 (10/31/2024): Yes      Health Maintenance Topics with due status: Not Due       Topic Last Completion Date    Colorectal Cancer Screening 07/10/2022    Cervical Cancer Screening 09/20/2024    Diabetes Urine Screening 10/31/2024    Lipid Panel 10/31/2024    Hemoglobin A1c 10/31/2024    Eye Exam 11/05/2024    RSV Vaccine (Age 60+ and Pregnant patients) Not Due       Future Appointments   Date Time Provider Department Center   5/5/2025  8:20 AM Winnie Berg FNP [facility]   9/22/2025  9:30 AM Sarai Cervantes FNP [facility]        Follow up in about 6 months (around 5/5/2025) for F2F, Follow up, RTC PRN, DM.          Signature:        LIBIA Gregorio  [facility]  [address] [facility] [address]    Date of encounter: 11/5/24

## 2024-11-05 NOTE — ASSESSMENT & PLAN NOTE
Lab Results   Component Value Date    HGBA1C 6.8 10/31/2024    HGBA1C 6.6 05/01/2024    HGBA1C 6.4 11/01/2023    HGBA1C 6.6 04/28/2023     Continue metformin as prescribed- refilled   Follow ADA diet.   Check blood glucose twice daily and as needed. Bring log to every office visit.  Avoid soda, simple sweets, and limit rice/pasta/bread/starches and consume brown options when possible.    Maintain healthy weight with BMI goal <30.    Perform aerobic exercise for 150 minutes per week (or 5 days a week for 30 minutes each day).    Examine feet daily.    Obtain annual dilated eye exam.   Kidney Protection: Declines.  Statin Therapy: Declines.  Eye exam: Fundus - 11/5/2024.  Foot exam:  11/5/2024.

## 2024-11-05 NOTE — PATIENT INSTRUCTIONS
Why Your Annual Diabetes Eye Exam Matters    As someone with diabetes, your annual eye exam is very important. Even if your vision seems fine, diabetes-related eye conditions can develop silently. Early detection is key to preventing vision loss. You should schedule a comprehensive eye exam annually. To schedule an appointment, please visit [email or web address] or call [phone].         How Diabetes Can Damage the Eyes    Diabetes can lead to several eye-related complications, primarily due to prolonged high blood sugar levels. These complications include:    Diabetic Retinopathy: High blood sugar levels damage the blood vessels in the retina--the light-sensitive tissue at the back of your eye. These blood vessels can swell, leak, or even close off, disrupting blood flow. In some cases, abnormal new blood vessels grow on the retina.    Cataracts: The natural lens inside your eye allows you to focus on images. When this lens becomes cloudy (like a smudged window), it's called a cataract. People with diabetes tend to develop cataracts earlier and experience faster progression of the condition.     Glaucoma: Diabetes increases the risk of glaucoma, a condition where pressure builds up inside the eye due to impaired fluid drainage. Glaucoma damages nerves and blood vessels, leading to vision changes.    Blurry Vision: High blood sugar can cause lens swelling, affecting your ability to see clearly. Correcting blood sugar levels usually restores vision, but it may take time.    Importance of a Diabetic Eye Exam    Caring for your eyes is a critical part of caring for your overall health. Follow these takeaways and visit [email or web address] to schedule your appointment today.    Early Detection Matters: Many eye conditions associated with diabetes may have noticeable symptoms at first. By the time symptoms become evident, irreversible damage may have occurred. Regular eye exams allow for early detection, which is critical for  effective treatment and prevention of vision loss.    Blood Glucose Control and Eye Health: Your eye health is closely linked to your blood glucose (blood sugar) levels. Elevated blood sugar can harm the delicate blood vessels in your eyes, leading to complications. Monitoring these levels through regular eye exams helps manage the risk.    Schedule a Comprehensive Dilated Eye Exam Once a Year: You should visit an eye doctor at least once per year. If you notice any changes in your vision, schedule an appointment immediately. Visit [email or web address] to find a specialist near you and schedule your appointment.

## 2024-12-16 ENCOUNTER — LAB VISIT (OUTPATIENT)
Dept: LAB | Facility: HOSPITAL | Age: 59
End: 2024-12-16
Payer: MEDICAID

## 2024-12-16 ENCOUNTER — TELEPHONE (OUTPATIENT)
Dept: INTERNAL MEDICINE | Facility: CLINIC | Age: 59
End: 2024-12-16
Payer: MEDICAID

## 2024-12-16 DIAGNOSIS — R31.29 MICROSCOPIC HEMATURIA: ICD-10-CM

## 2024-12-16 DIAGNOSIS — N39.0 RECURRENT UTI (URINARY TRACT INFECTION): ICD-10-CM

## 2024-12-16 DIAGNOSIS — N39.0 RECURRENT UTI (URINARY TRACT INFECTION): Primary | ICD-10-CM

## 2024-12-16 LAB
BACTERIA #/AREA URNS AUTO: ABNORMAL /HPF
BILIRUB UR QL STRIP.AUTO: NEGATIVE
CLARITY UR: CLEAR
COLOR UR AUTO: ABNORMAL
GLUCOSE UR QL STRIP: NORMAL
HGB UR QL STRIP: ABNORMAL
HYALINE CASTS #/AREA URNS LPF: ABNORMAL /LPF
KETONES UR QL STRIP: NEGATIVE
LEUKOCYTE ESTERASE UR QL STRIP: NEGATIVE
MUCOUS THREADS URNS QL MICRO: ABNORMAL /LPF
NITRITE UR QL STRIP: NEGATIVE
PH UR STRIP: 5 [PH]
PROT UR QL STRIP: NEGATIVE
RBC #/AREA URNS AUTO: ABNORMAL /HPF
SP GR UR STRIP.AUTO: 1.03 (ref 1–1.03)
SQUAMOUS #/AREA URNS LPF: ABNORMAL /HPF
UROBILINOGEN UR STRIP-ACNC: NORMAL
WBC #/AREA URNS AUTO: ABNORMAL /HPF

## 2024-12-16 PROCEDURE — 88108 CYTOPATH CONCENTRATE TECH: CPT | Mod: TC

## 2024-12-16 PROCEDURE — 81001 URINALYSIS AUTO W/SCOPE: CPT

## 2024-12-16 NOTE — TELEPHONE ENCOUNTER
----- Message from Jess sent at 12/16/2024  8:48 AM CST -----  Who Called: Alondra Plasencia    Caller is requesting assistance/information from provider's office.    Symptoms (please be specific): bladder infection   How long has patient had these symptoms:   a week  List of preferred pharmacies on file (remove unneeded): [unfilled]  If different, enter pharmacy into here including location and phone number: [facility]pharmacy #[id number] [place] - [address]   Phone: [phone]  Fax: [phone]            Preferred Method of Contact: Phone Call  Patient's Preferred Phone Number on File: [phone]   Best Call Back Number, if different:  Additional Information: medical advice, please advise, thanks

## 2024-12-16 NOTE — TELEPHONE ENCOUNTER
Spoke with patient and stated she is having urinary frequency, diarrhea and vomiting and is requesting to be tested for a UTI, PCP notified.

## 2024-12-17 ENCOUNTER — TELEPHONE (OUTPATIENT)
Dept: INTERNAL MEDICINE | Facility: CLINIC | Age: 59
End: 2024-12-17
Payer: MEDICAID

## 2024-12-17 DIAGNOSIS — R31.29 MICROSCOPIC HEMATURIA: Primary | ICD-10-CM

## 2024-12-17 DIAGNOSIS — R11.0 NAUSEA: Primary | ICD-10-CM

## 2024-12-17 LAB
ESTROGEN SERPL-MCNC: NORMAL PG/ML
INSULIN SERPL-ACNC: NORMAL U[IU]/ML
LAB AP CLINICAL INFORMATION: NORMAL
LAB AP GROSS DESCRIPTION: NORMAL
LAB AP URINE CYTOLOGY INTERPRETATION SPECIMEN 1: NORMAL

## 2024-12-17 RX ORDER — ONDANSETRON 4 MG/1
8 TABLET, ORALLY DISINTEGRATING ORAL EVERY 8 HOURS PRN
Qty: 30 TABLET | Refills: 1 | Status: SHIPPED | OUTPATIENT
Start: 2024-12-17 | End: 2025-12-17

## 2024-12-17 NOTE — TELEPHONE ENCOUNTER
----- Message from Nurse Moran sent at 12/16/2024 12:01 PM CST -----  Patient is requesting medication for nausea.  ----- Message -----  From: Haydee Dunn  Sent: 12/16/2024  11:44 AM CST  To: [facility] Staff    Pt would like to speak to a nurse about getting a nausea med sent in for UTI

## 2024-12-19 ENCOUNTER — PATIENT MESSAGE (OUTPATIENT)
Dept: INTERNAL MEDICINE | Facility: CLINIC | Age: 59
End: 2024-12-19
Payer: MEDICAID

## 2024-12-19 DIAGNOSIS — R19.7 DIARRHEA, UNSPECIFIED TYPE: Primary | ICD-10-CM

## 2024-12-22 ENCOUNTER — OFFICE VISIT (OUTPATIENT)
Dept: URGENT CARE | Facility: CLINIC | Age: 59
End: 2024-12-22
Payer: MEDICAID

## 2024-12-22 VITALS
TEMPERATURE: 98 F | HEIGHT: 65 IN | DIASTOLIC BLOOD PRESSURE: 74 MMHG | OXYGEN SATURATION: 99 % | BODY MASS INDEX: 26.49 KG/M2 | WEIGHT: 159 LBS | RESPIRATION RATE: 18 BRPM | SYSTOLIC BLOOD PRESSURE: 130 MMHG | HEART RATE: 100 BPM

## 2024-12-22 DIAGNOSIS — R19.7 DIARRHEA, UNSPECIFIED TYPE: ICD-10-CM

## 2024-12-22 DIAGNOSIS — K31.84 GASTROPARESIS: Primary | ICD-10-CM

## 2024-12-22 DIAGNOSIS — R31.9 HEMATURIA, UNSPECIFIED TYPE: ICD-10-CM

## 2024-12-22 LAB
BACTERIA #/AREA URNS AUTO: ABNORMAL /HPF
BILIRUB UR QL STRIP.AUTO: NEGATIVE
BILIRUB UR QL STRIP: NEGATIVE
CLARITY UR: CLEAR
COLOR UR AUTO: ABNORMAL
GLUCOSE UR QL STRIP: NEGATIVE
GLUCOSE UR QL STRIP: NORMAL
HGB UR QL STRIP: ABNORMAL
HYALINE CASTS #/AREA URNS LPF: ABNORMAL /LPF
KETONES UR QL STRIP: NEGATIVE
KETONES UR QL STRIP: NEGATIVE
LEUKOCYTE ESTERASE UR QL STRIP: NEGATIVE
LEUKOCYTE ESTERASE UR QL STRIP: NEGATIVE
NITRITE UR QL STRIP: NEGATIVE
PH UR STRIP: 6 [PH]
PH, POC UA: 6
POC BLOOD, URINE: POSITIVE
POC NITRATES, URINE: NEGATIVE
PROT UR QL STRIP: NEGATIVE
PROT UR QL STRIP: NEGATIVE
RBC #/AREA URNS AUTO: ABNORMAL /HPF
SP GR UR STRIP.AUTO: 1.01 (ref 1–1.03)
SP GR UR STRIP: 1 (ref 1–1.03)
SQUAMOUS #/AREA URNS LPF: ABNORMAL /HPF
UROBILINOGEN UR STRIP-ACNC: 0.2 (ref 0.1–1.1)
UROBILINOGEN UR STRIP-ACNC: NORMAL
WBC #/AREA URNS AUTO: ABNORMAL /HPF

## 2024-12-22 PROCEDURE — 81003 URINALYSIS AUTO W/O SCOPE: CPT | Mod: PBBFAC | Performed by: NURSE PRACTITIONER

## 2024-12-22 PROCEDURE — 81001 URINALYSIS AUTO W/SCOPE: CPT | Performed by: NURSE PRACTITIONER

## 2024-12-22 PROCEDURE — 99203 OFFICE O/P NEW LOW 30 MIN: CPT | Mod: S$PBB,,, | Performed by: NURSE PRACTITIONER

## 2024-12-22 PROCEDURE — 99214 OFFICE O/P EST MOD 30 MIN: CPT | Mod: PBBFAC | Performed by: NURSE PRACTITIONER

## 2024-12-22 RX ORDER — METOCLOPRAMIDE 10 MG/1
10 TABLET ORAL 3 TIMES DAILY PRN
Qty: 30 TABLET | Refills: 0 | Status: SHIPPED | OUTPATIENT
Start: 2024-12-22

## 2024-12-22 NOTE — PATIENT INSTRUCTIONS
Please follow instructions on patient education material.      Return to urgent care in 2 to 3 days if symptoms are not improving, immediately if you develop any new or worsening symptoms.     Drink 8 to 10 glasses of liquid each day. Ask your doctor about using nutritional supplements.  You may need to limit or avoid carbonated drinks if they make your signs worse.  Walk or sit after meals rather than lying down.  If you have high blood sugar, keep it under control.    Go to the ER if you experience chest pain with shortness of breath, shortness of breath when moving around your house, high fevers 103.0+, excessive vomiting/diarrhea, or general distress.

## 2024-12-22 NOTE — PROGRESS NOTES
"Subjective:      Patient ID: Alondra Plasencia is a 59 y.o. female.    Vitals:  height is 5' 5" (1.651 m) and weight is 72.1 kg (159 lb). Her oral temperature is 97.9 °F (36.6 °C). Her blood pressure is 130/74 and her pulse is 100. Her respiration is 18 and oxygen saturation is 99%.     Chief Complaint: Diarrhea (Diarrhea and stomach pain, yellow, odorous stool x 1 week )    HPI  ROS   Objective:     Physical Exam   Constitutional: She appears well-developed.  Non-toxic appearance. She does not appear ill. No distress.   HENT:   Head: Atraumatic.   Nose: No purulent discharge. Right sinus exhibits no maxillary sinus tenderness and no frontal sinus tenderness. Left sinus exhibits no maxillary sinus tenderness and no frontal sinus tenderness.   Mouth/Throat: Uvula is midline.   Eyes: Right eye exhibits no discharge. Left eye exhibits no discharge. Extraocular movement intact   Neck: Neck supple. No neck rigidity present.   Cardiovascular: Regular rhythm.   Pulmonary/Chest: Effort normal and breath sounds normal. No respiratory distress. She has no wheezes. She has no rhonchi. She has no rales.   Abdominal: Bowel sounds are normal. Soft. There is no right CVA tenderness.   Lymphadenopathy:     She has no cervical adenopathy.   Neurological: She is alert.   Skin: Skin is warm, dry and not diaphoretic.   Psychiatric: Her behavior is normal.   Nursing note and vitals reviewed.      Assessment:     1. Gastroparesis    2. Diarrhea, unspecified type      Results for orders placed or performed in visit on 12/22/24   POCT Urinalysis, Dipstick, Manual, W/O Scope    Collection Time: 12/22/24 11:57 AM   Result Value Ref Range    POC Blood, Urine Positive (A) Negative    POC Bilirubin, Urine Negative Negative    POC Urobilinogen, Urine 0.2 0.1 - 1.1    POC Ketones, Urine Negative Negative    POC Protein, Urine Negative Negative    POC Nitrates, Urine Negative Negative    POC Glucose, Urine Negative Negative    pH, UA 6.0     POC " Specific Gravity, Urine 1.005 1.003 - 1.029    POC Leukocytes, Urine Negative Negative       Plan:   Abdominal exam benign  ER precautions given and discussed  Patient encouraged to start dietary modification restart fluids and electrolytes  Gastroparesis  -     metoclopramide HCl (REGLAN) 10 MG tablet; Take 1 tablet (10 mg total) by mouth 3 (three) times daily as needed (stomach pain).  Dispense: 30 tablet; Refill: 0    Diarrhea, unspecified type  -     POCT Urinalysis, Dipstick, Manual, W/O Scope  -     metoclopramide HCl (REGLAN) 10 MG tablet; Take 1 tablet (10 mg total) by mouth 3 (three) times daily as needed (stomach pain).  Dispense: 30 tablet; Refill: 0

## 2024-12-23 ENCOUNTER — APPOINTMENT (OUTPATIENT)
Dept: LAB | Facility: HOSPITAL | Age: 59
End: 2024-12-23
Payer: MEDICAID

## 2024-12-23 LAB
CRYPTOSP AG SPEC QL: NEGATIVE
FECAL LEUKOCYTE (OHS): NEGATIVE
G LAMBLIA AG STL QL IA: NEGATIVE
H. PYLORI STOOL: NEGATIVE

## 2024-12-28 LAB — CALPROTECTIN STL-MCNT: <50 MCG/G

## 2024-12-31 ENCOUNTER — HOSPITAL ENCOUNTER (OUTPATIENT)
Dept: RADIOLOGY | Facility: HOSPITAL | Age: 59
Discharge: HOME | End: 2024-12-31
Payer: MEDICAID

## 2024-12-31 DIAGNOSIS — R31.29 MICROSCOPIC HEMATURIA: ICD-10-CM

## 2024-12-31 PROCEDURE — 74176 CT ABD & PELVIS W/O CONTRAST: CPT | Mod: TC

## 2025-01-07 ENCOUNTER — PATIENT MESSAGE (OUTPATIENT)
Dept: INTERNAL MEDICINE | Facility: CLINIC | Age: 60
End: 2025-01-07
Payer: MEDICAID

## 2025-01-16 ENCOUNTER — TELEPHONE (OUTPATIENT)
Dept: INTERNAL MEDICINE | Facility: CLINIC | Age: 60
End: 2025-01-16
Payer: MEDICAID

## 2025-05-01 ENCOUNTER — LAB VISIT (OUTPATIENT)
Dept: LAB | Facility: HOSPITAL | Age: 60
End: 2025-05-01
Payer: MEDICAID

## 2025-05-01 DIAGNOSIS — E78.2 MIXED HYPERLIPIDEMIA: ICD-10-CM

## 2025-05-01 DIAGNOSIS — E11.9 TYPE 2 DIABETES MELLITUS WITHOUT COMPLICATION, WITHOUT LONG-TERM CURRENT USE OF INSULIN: ICD-10-CM

## 2025-05-01 LAB
ALBUMIN SERPL-MCNC: 3.9 G/DL (ref 3.4–4.8)
ALBUMIN/GLOB SERPL: 1.1 RATIO (ref 1.1–2)
ALP SERPL-CCNC: 108 UNIT/L (ref 40–150)
ALT SERPL-CCNC: 20 UNIT/L (ref 0–55)
ANION GAP SERPL CALC-SCNC: 8 MEQ/L
AST SERPL-CCNC: 17 UNIT/L (ref 11–45)
BACTERIA #/AREA URNS AUTO: ABNORMAL /HPF
BILIRUB SERPL-MCNC: 0.4 MG/DL
BILIRUB UR QL STRIP.AUTO: NEGATIVE
BUN SERPL-MCNC: 18.2 MG/DL (ref 9.8–20.1)
CALCIUM SERPL-MCNC: 9.5 MG/DL (ref 8.4–10.2)
CHLORIDE SERPL-SCNC: 107 MMOL/L (ref 98–107)
CHOLEST SERPL-MCNC: 211 MG/DL
CHOLEST/HDLC SERPL: 4 {RATIO} (ref 0–5)
CLARITY UR: CLEAR
CO2 SERPL-SCNC: 25 MMOL/L (ref 23–31)
COLOR UR AUTO: ABNORMAL
CREAT SERPL-MCNC: 0.85 MG/DL (ref 0.55–1.02)
CREAT UR-MCNC: 100.8 MG/DL (ref 45–106)
CREAT/UREA NIT SERPL: 21
EST. AVERAGE GLUCOSE BLD GHB EST-MCNC: 142.7 MG/DL
GFR SERPLBLD CREATININE-BSD FMLA CKD-EPI: >60 ML/MIN/1.73/M2
GLOBULIN SER-MCNC: 3.7 GM/DL (ref 2.4–3.5)
GLUCOSE SERPL-MCNC: 143 MG/DL (ref 82–115)
GLUCOSE UR QL STRIP: NORMAL
HBA1C MFR BLD: 6.6 %
HDLC SERPL-MCNC: 58 MG/DL (ref 35–60)
HGB UR QL STRIP: ABNORMAL
HYALINE CASTS #/AREA URNS LPF: ABNORMAL /LPF
KETONES UR QL STRIP: NEGATIVE
LDLC SERPL CALC-MCNC: 130 MG/DL (ref 50–140)
LEUKOCYTE ESTERASE UR QL STRIP: NEGATIVE
MICROALBUMIN UR-MCNC: 7.6 UG/ML
MICROALBUMIN/CREAT RATIO PNL UR: 7.5 MG/GM CR (ref 0–30)
MUCOUS THREADS URNS QL MICRO: ABNORMAL /LPF
NITRITE UR QL STRIP: NEGATIVE
PH UR STRIP: 5 [PH]
POTASSIUM SERPL-SCNC: 4.6 MMOL/L (ref 3.5–5.1)
PROT SERPL-MCNC: 7.6 GM/DL (ref 5.8–7.6)
PROT UR QL STRIP: NEGATIVE
RBC #/AREA URNS AUTO: ABNORMAL /HPF
SODIUM SERPL-SCNC: 140 MMOL/L (ref 136–145)
SP GR UR STRIP.AUTO: 1.02 (ref 1–1.03)
SQUAMOUS #/AREA URNS LPF: ABNORMAL /HPF
TRIGL SERPL-MCNC: 115 MG/DL (ref 37–140)
UROBILINOGEN UR STRIP-ACNC: NORMAL
VLDLC SERPL CALC-MCNC: 23 MG/DL
WBC #/AREA URNS AUTO: ABNORMAL /HPF

## 2025-05-01 PROCEDURE — 80061 LIPID PANEL: CPT

## 2025-05-01 PROCEDURE — 83036 HEMOGLOBIN GLYCOSYLATED A1C: CPT

## 2025-05-01 PROCEDURE — 36415 COLL VENOUS BLD VENIPUNCTURE: CPT

## 2025-05-01 PROCEDURE — 81001 URINALYSIS AUTO W/SCOPE: CPT

## 2025-05-01 PROCEDURE — 80053 COMPREHEN METABOLIC PANEL: CPT

## 2025-05-01 PROCEDURE — 82043 UR ALBUMIN QUANTITATIVE: CPT

## 2025-05-02 ENCOUNTER — PATIENT MESSAGE (OUTPATIENT)
Dept: INTERNAL MEDICINE | Facility: CLINIC | Age: 60
End: 2025-05-02
Payer: MEDICAID

## 2025-05-05 ENCOUNTER — OFFICE VISIT (OUTPATIENT)
Dept: INTERNAL MEDICINE | Facility: CLINIC | Age: 60
End: 2025-05-05
Payer: MEDICAID

## 2025-05-05 VITALS
HEART RATE: 81 BPM | WEIGHT: 164.31 LBS | OXYGEN SATURATION: 97 % | TEMPERATURE: 98 F | HEIGHT: 65 IN | SYSTOLIC BLOOD PRESSURE: 119 MMHG | DIASTOLIC BLOOD PRESSURE: 80 MMHG | RESPIRATION RATE: 18 BRPM | BODY MASS INDEX: 27.38 KG/M2

## 2025-05-05 DIAGNOSIS — Z13.29 SCREENING FOR THYROID DISORDER: ICD-10-CM

## 2025-05-05 DIAGNOSIS — E78.2 MIXED HYPERLIPIDEMIA: ICD-10-CM

## 2025-05-05 DIAGNOSIS — E11.9 TYPE 2 DIABETES MELLITUS WITHOUT COMPLICATION, WITHOUT LONG-TERM CURRENT USE OF INSULIN: Primary | ICD-10-CM

## 2025-05-05 DIAGNOSIS — Z13.21 ENCOUNTER FOR VITAMIN DEFICIENCY SCREENING: ICD-10-CM

## 2025-05-05 DIAGNOSIS — Z53.20 STATIN DECLINED: ICD-10-CM

## 2025-05-05 PROCEDURE — 3074F SYST BP LT 130 MM HG: CPT | Mod: CPTII,,,

## 2025-05-05 PROCEDURE — 3044F HG A1C LEVEL LT 7.0%: CPT | Mod: CPTII,,,

## 2025-05-05 PROCEDURE — 3008F BODY MASS INDEX DOCD: CPT | Mod: CPTII,,,

## 2025-05-05 PROCEDURE — 1159F MED LIST DOCD IN RCRD: CPT | Mod: CPTII,,,

## 2025-05-05 PROCEDURE — 99214 OFFICE O/P EST MOD 30 MIN: CPT | Mod: S$PBB,,,

## 2025-05-05 PROCEDURE — 3079F DIAST BP 80-89 MM HG: CPT | Mod: CPTII,,,

## 2025-05-05 PROCEDURE — 3061F NEG MICROALBUMINURIA REV: CPT | Mod: CPTII,,,

## 2025-05-05 PROCEDURE — 1160F RVW MEDS BY RX/DR IN RCRD: CPT | Mod: CPTII,,,

## 2025-05-05 PROCEDURE — 99215 OFFICE O/P EST HI 40 MIN: CPT | Mod: PBBFAC

## 2025-05-05 PROCEDURE — 3066F NEPHROPATHY DOC TX: CPT | Mod: CPTII,,,

## 2025-05-05 NOTE — ASSESSMENT & PLAN NOTE
Lab Results   Component Value Date    HGBA1C 6.6 05/01/2025    HGBA1C 6.8 10/31/2024    HGBA1C 6.6 05/01/2024    HGBA1C 6.4 11/01/2023   At goal.  Continue metformin as prescribed- refilled   Follow ADA diet.   Check blood glucose twice daily and as needed. Bring log to every office visit.  Avoid soda, simple sweets, and limit rice/pasta/bread/starches and consume brown options when possible.    Maintain healthy weight with BMI goal <30.    Perform aerobic exercise for 150 minutes per week (or 5 days a week for 30 minutes each day).    Examine feet daily.    Obtain annual dilated eye exam.   Kidney Protection: Declines.  Statin Therapy: Declines.  Eye exam: Fundus - 11/5/2024.  Foot exam:  11/5/2024.

## 2025-05-05 NOTE — PATIENT INSTRUCTIONS
Call Centralized Scheduling at [phone] to schedule Mammogram.     REMINDER: Please complete labs within 1 week of appointment.   Please complete satisfaction survey when received. Thank you.    Bhaskar Cantu,     If you are due for any health screening(s) below please notify me so we can arrange them to be ordered and scheduled. Most healthy patients at your age complete them, but you are free to accept or refuse.     If you can't do it, I'll definitely understand. If you can, I'd certainly appreciate it!    Tests to Keep You Healthy    Mammogram: ORDERED BUT NOT SCHEDULED  Eye Exam: Met on 11/5/2024  Colon Cancer Screening: Met on 7/10/2022  Cervical Cancer Screening: Met on 9/20/2024  Last HbA1c < 8 (05/01/2025): Yes      Schedule your breast cancer screening today     Breast cancer is the second most common cancer in women,  and the second leading cause of death from cancer. Mammograms can detect breast cancer early, which significantly increases the chances of curing the cancer.       Our records indicate that you may be overdue for breast cancer screening. Cancer screenings save lives, so schedule yours today to stay healthy.     If you recently had a mammogram performed outside of [facility], please let your Health care team know so that they can update your health record.

## 2025-05-05 NOTE — ASSESSMENT & PLAN NOTE
Lab Results   Component Value Date    .00 05/01/2025       Lab Results   Component Value Date    TRIG 115 05/01/2025       Lab Results   Component Value Date    HDL 58 05/01/2025        Lab Results   Component Value Date    CHOL 211 (H) 05/01/2025      Continue to decline statin therapy although LDL is not at goal - will repeat in 6 months.  Follow a low cholesterol, low saturated fat diet with less than 200 mg of cholesterol a day.   Avoid fried foods and high saturated fats.  Add flax seed or fish oil supplements to diet.   Increase dietary fiber.   Regular exercise improves cholesterol levels.  Physical activity 5 times a week for 30 minutes per day (or 150 minutes per week).   Stressed importance of dietary modifications.

## 2025-05-20 LAB
LEFT EYE DM RETINOPATHY: NEGATIVE
RIGHT EYE DM RETINOPATHY: NEGATIVE

## 2025-05-23 ENCOUNTER — DOCUMENTATION ONLY (OUTPATIENT)
Dept: INTERNAL MEDICINE | Facility: CLINIC | Age: 60
End: 2025-05-23
Payer: MEDICAID

## 2025-07-15 ENCOUNTER — PATIENT MESSAGE (OUTPATIENT)
Facility: CLINIC | Age: 60
End: 2025-07-15
Payer: MEDICAID